# Patient Record
Sex: MALE | Race: BLACK OR AFRICAN AMERICAN | NOT HISPANIC OR LATINO | Employment: FULL TIME | URBAN - METROPOLITAN AREA
[De-identification: names, ages, dates, MRNs, and addresses within clinical notes are randomized per-mention and may not be internally consistent; named-entity substitution may affect disease eponyms.]

---

## 2017-05-09 DIAGNOSIS — E55.9 VITAMIN D DEFICIENCY: ICD-10-CM

## 2017-05-09 DIAGNOSIS — E03.9 HYPOTHYROIDISM, UNSPECIFIED TYPE: Primary | ICD-10-CM

## 2017-05-09 DIAGNOSIS — E78.2 MIXED HYPERLIPIDEMIA: ICD-10-CM

## 2017-05-09 DIAGNOSIS — E11.9 TYPE 2 DIABETES MELLITUS WITHOUT COMPLICATION, UNSPECIFIED LONG TERM INSULIN USE STATUS: ICD-10-CM

## 2017-05-09 PROBLEM — D64.9 ANEMIA: Status: ACTIVE | Noted: 2017-05-09

## 2017-05-23 RX ORDER — VARDENAFIL HCL 20 MG
TABLET ORAL
Qty: 4 TABLET | Refills: 0 | Status: SHIPPED | OUTPATIENT
Start: 2017-05-23 | End: 2017-06-08 | Stop reason: SDUPTHER

## 2017-05-24 ENCOUNTER — RESULTS ENCOUNTER (OUTPATIENT)
Dept: ENDOCRINOLOGY | Age: 67
End: 2017-05-24

## 2017-05-24 DIAGNOSIS — F52.21 ERECTILE DYSFUNCTION OF NONORGANIC ORIGIN: ICD-10-CM

## 2017-05-24 DIAGNOSIS — E78.2 MIXED HYPERLIPIDEMIA: ICD-10-CM

## 2017-05-24 DIAGNOSIS — E55.9 VITAMIN D DEFICIENCY: ICD-10-CM

## 2017-05-24 DIAGNOSIS — I10 ESSENTIAL HYPERTENSION: ICD-10-CM

## 2017-05-24 DIAGNOSIS — IMO0001 UNCONTROLLED TYPE 2 DIABETES MELLITUS WITHOUT COMPLICATION, WITHOUT LONG-TERM CURRENT USE OF INSULIN: ICD-10-CM

## 2017-05-24 DIAGNOSIS — E03.9 HYPOTHYROIDISM, UNSPECIFIED TYPE: ICD-10-CM

## 2017-06-01 ENCOUNTER — LAB (OUTPATIENT)
Dept: ENDOCRINOLOGY | Age: 67
End: 2017-06-01

## 2017-06-01 DIAGNOSIS — E78.2 MIXED HYPERLIPIDEMIA: ICD-10-CM

## 2017-06-01 DIAGNOSIS — E03.9 HYPOTHYROIDISM, UNSPECIFIED TYPE: ICD-10-CM

## 2017-06-01 DIAGNOSIS — E55.9 VITAMIN D DEFICIENCY: ICD-10-CM

## 2017-06-01 DIAGNOSIS — E11.9 TYPE 2 DIABETES MELLITUS WITHOUT COMPLICATION, UNSPECIFIED LONG TERM INSULIN USE STATUS: ICD-10-CM

## 2017-06-07 LAB
25(OH)D3+25(OH)D2 SERPL-MCNC: 31.9 NG/ML (ref 30–100)
ALBUMIN SERPL-MCNC: 3.9 G/DL (ref 3.5–5.2)
ALBUMIN/GLOB SERPL: 1.8 G/DL
ALP SERPL-CCNC: 55 U/L (ref 39–117)
ALT SERPL-CCNC: 15 U/L (ref 1–41)
AST SERPL-CCNC: 13 U/L (ref 1–40)
BILIRUB SERPL-MCNC: 0.5 MG/DL (ref 0.1–1.2)
BUN SERPL-MCNC: 12 MG/DL (ref 8–23)
BUN/CREAT SERPL: 10.6 (ref 7–25)
C PEPTIDE SERPL-MCNC: 2.4 NG/ML (ref 1.1–4.4)
CALCIUM SERPL-MCNC: 9.2 MG/DL (ref 8.6–10.5)
CHLORIDE SERPL-SCNC: 105 MMOL/L (ref 98–107)
CHOLEST SERPL-MCNC: 146 MG/DL (ref 0–200)
CO2 SERPL-SCNC: 28 MMOL/L (ref 22–29)
CREAT SERPL-MCNC: 1.13 MG/DL (ref 0.76–1.27)
FT4I SERPL CALC-MCNC: 2 (ref 1.2–4.9)
GLOBULIN SER CALC-MCNC: 2.2 GM/DL
GLUCOSE SERPL-MCNC: 75 MG/DL (ref 65–99)
HBA1C MFR BLD: 6.8 % (ref 4.8–5.6)
HDLC SERPL-MCNC: 53 MG/DL (ref 40–60)
LDLC SERPL CALC-MCNC: 85 MG/DL (ref 0–100)
MICROALBUMIN UR-MCNC: 5.1 UG/ML
POTASSIUM SERPL-SCNC: 4.2 MMOL/L (ref 3.5–5.2)
PROT SERPL-MCNC: 6.1 G/DL (ref 6–8.5)
SODIUM SERPL-SCNC: 144 MMOL/L (ref 136–145)
T3FREE SERPL-MCNC: 2.7 PG/ML (ref 2–4.4)
T3RU NFR SERPL: 29 % (ref 24–39)
T4 FREE SERPL-MCNC: 1.14 NG/DL (ref 0.93–1.7)
T4 SERPL-MCNC: 6.8 UG/DL (ref 4.5–12)
THYROGLOB AB SERPL-ACNC: 20 IU/ML
THYROGLOB SERPL-MCNC: 5.7 NG/ML
THYROGLOB SERPL-MCNC: ABNORMAL NG/ML
TRIGL SERPL-MCNC: 38 MG/DL (ref 0–150)
TSH SERPL DL<=0.005 MIU/L-ACNC: 2.01 UIU/ML (ref 0.45–4.5)
VLDLC SERPL CALC-MCNC: 7.6 MG/DL (ref 5–40)

## 2017-06-08 ENCOUNTER — OFFICE VISIT (OUTPATIENT)
Dept: ENDOCRINOLOGY | Age: 67
End: 2017-06-08

## 2017-06-08 VITALS
BODY MASS INDEX: 31.25 KG/M2 | DIASTOLIC BLOOD PRESSURE: 74 MMHG | WEIGHT: 235.8 LBS | SYSTOLIC BLOOD PRESSURE: 122 MMHG | HEIGHT: 73 IN

## 2017-06-08 DIAGNOSIS — E03.9 HYPOTHYROIDISM, UNSPECIFIED TYPE: ICD-10-CM

## 2017-06-08 DIAGNOSIS — E78.5 DYSLIPIDEMIA: ICD-10-CM

## 2017-06-08 DIAGNOSIS — I10 ESSENTIAL HYPERTENSION: ICD-10-CM

## 2017-06-08 DIAGNOSIS — E78.2 MIXED HYPERLIPIDEMIA: ICD-10-CM

## 2017-06-08 DIAGNOSIS — E11.8 CONTROLLED TYPE 2 DIABETES MELLITUS WITH COMPLICATION, WITHOUT LONG-TERM CURRENT USE OF INSULIN (HCC): Primary | ICD-10-CM

## 2017-06-08 DIAGNOSIS — E55.9 VITAMIN D DEFICIENCY: ICD-10-CM

## 2017-06-08 PROCEDURE — 99214 OFFICE O/P EST MOD 30 MIN: CPT | Performed by: NURSE PRACTITIONER

## 2017-06-08 RX ORDER — AMLODIPINE BESYLATE 10 MG/1
10 TABLET ORAL DAILY
COMMUNITY
Start: 2017-03-24 | End: 2017-12-14 | Stop reason: SDUPTHER

## 2017-06-08 RX ORDER — VALSARTAN 320 MG/1
320 TABLET ORAL DAILY
COMMUNITY
Start: 2017-03-24 | End: 2017-12-14 | Stop reason: SDUPTHER

## 2017-06-08 NOTE — PROGRESS NOTES
"hCarles Segura Sr. is a 66 y.o. male is here today for follow-up.  Chief Complaint   Patient presents with   • Diabetes     recent labs, pt did not bring meter, test BG infrequently.    • Hypothyroidism   • Vitamin D Deficiency   • Hyperlipidemia     /74  Ht 73\" (185.4 cm)  Wt 235 lb 12.8 oz (107 kg)  BMI 31.11 kg/m2  Current Outpatient Prescriptions on File Prior to Visit   Medication Sig   • aspirin 325 MG tablet Take 325 mg by mouth As Needed.   • atorvastatin (LIPITOR) 40 MG tablet Take 1 tablet by mouth Daily.   • CALCIUM PO Take by mouth.   • carvedilol CR (COREG CR) 80 MG 24 hr capsule Take 1 capsule by mouth Daily.   • Cholecalciferol (VITAMIN D) 1000 UNITS tablet Take 1,000 Units by mouth Daily.   • Cyanocobalamin (VITAMIN B-12 PO) Take by mouth.   • doxycycline (VIBRAMYCIN) 50 MG capsule Take 1 capsule by mouth daily.   • famotidine (PEPCID) 20 MG tablet Take 1 tablet by mouth 2 (two) times a day.   • Ferrous Sulfate (IRON) 325 (65 FE) MG tablet Take by mouth.   • glipiZIDE (GLIPIZIDE XL) 10 MG 24 hr tablet Take one tablet BID   • glucose blood test strip OneTouch Ultra Blue In Vitro Strip; Patient Sig: OneTouch Ultra Blue In Vitro Strip TEST 3 TIMES A DAY AND AS NEEDED; 100; 2; 29-May-2013; Active   • Hydrocortisone Butyr Lipo Base 0.1 % cream Apply topically.   • LEVITRA 20 MG tablet TAKE 1 TABLET BY MOUTH ONCE A WEEK   • levothyroxine (SYNTHROID, LEVOTHROID) 75 MCG tablet Take 1 tablet by mouth Daily.   • ONE TOUCH LANCETS misc    • pioglitazone-metFORMIN (ACTOPLUS MET)  MG per tablet Take 1 tablet every morning and two tablets at night   • SITagliptin (JANUVIA) 100 MG tablet Take 1 tablet by mouth Daily.   • [DISCONTINUED] glimepiride (AMARYL) 4 MG tablet Take 1 tablet by mouth 2 (two) times a day.   • [DISCONTINUED] amlodipine-olmesartan (JASON) 10-40 MG per tablet Take 1 tablet by mouth daily.   • [DISCONTINUED] LEVITRA 20 MG tablet TAKE 1 TABLET BY MOUTH ONCE A WEEK "     No current facility-administered medications on file prior to visit.      Family History   Problem Relation Age of Onset   • Heart failure Mother    • Diabetes Mother    • Heart disease Mother    • Hypertension Mother    • Thyroid disease Mother    • Diabetes Maternal Grandmother    • Hypertension Maternal Grandmother    • Diabetes Paternal Grandmother      Social History   Substance Use Topics   • Smoking status: Never Smoker   • Smokeless tobacco: None   • Alcohol use No      Comment: stopped     No Known Allergies      History of Present Illness  Encounter Diagnoses   Name Primary?   • Uncontrolled type 2 diabetes mellitus with other specified complication, without long-term current use of insulin Yes   • Vitamin D deficiency    • Hypothyroidism, unspecified type    • Mixed hyperlipidemia    • Essential hypertension    • Dyslipidemia    This is a 66-year-old male patient here today for routine follow-up visit for the above-mentioned problems.  He is taking his medications as prescribed.  He denies any hypoglycemic reactions.  He has had no reason which to go the emergency room.  He is working to lose weight and would like to lose about 10 more pounds.  He states he has stopped about 5 pounds recently.  He is watching his diet and exercising in order to lose weight.  He is taking daily over-the-counter vitamin D.  He had recent labs which were reviewed and he was provided a copy.  He states he has good energy and has no complaints at today's visit.        The following portions of the patient's history were reviewed and updated as appropriate: allergies, current medications, past family history, past medical history, past social history, past surgical history and problem list.    Review of Systems   Constitutional: Negative for fatigue.   HENT: Negative for trouble swallowing.    Eyes: Negative for visual disturbance.   Respiratory: Negative for shortness of breath.    Cardiovascular: Negative for leg  swelling.   Endocrine: Negative for polyphagia.   Skin: Negative for wound.   Neurological: Negative for numbness.       Objective   Physical Exam   Constitutional: He is oriented to person, place, and time. He appears well-developed and well-nourished. No distress.   HENT:   Head: Normocephalic and atraumatic.   Right Ear: External ear normal.   Left Ear: External ear normal.   Nose: Nose normal.   Mouth/Throat: Oropharynx is clear and moist.   Eyes: Conjunctivae and EOM are normal. Pupils are equal, round, and reactive to light. Right eye exhibits no discharge. Left eye exhibits no discharge. No scleral icterus.   Neck: Normal range of motion. Neck supple. No JVD present. No tracheal deviation present. No thyromegaly present.   Cardiovascular: Normal rate, regular rhythm, normal heart sounds and intact distal pulses.  Exam reveals no gallop and no friction rub.    No murmur heard.  Pulmonary/Chest: Effort normal and breath sounds normal. No stridor. No respiratory distress. He has no wheezes. He has no rales. He exhibits no tenderness.   Abdominal: Soft. Bowel sounds are normal. He exhibits no distension and no mass. There is no tenderness. There is no rebound and no guarding. No hernia.   Musculoskeletal: Normal range of motion. He exhibits no edema, tenderness or deformity.   Lymphadenopathy:     He has no cervical adenopathy.   Neurological: He is alert and oriented to person, place, and time. He has normal reflexes. He displays normal reflexes. No cranial nerve deficit. He exhibits normal muscle tone. Coordination normal.   Skin: Skin is warm. No rash noted. He is not diaphoretic. No erythema. No pallor.   Psychiatric: He has a normal mood and affect. His behavior is normal. Judgment and thought content normal.   Nursing note and vitals reviewed.    Results for orders placed or performed in visit on 06/01/17   Comprehensive Metabolic Panel   Result Value Ref Range    Glucose 75 65 - 99 mg/dL    BUN 12 8 - 23  mg/dL    Creatinine 1.13 0.76 - 1.27 mg/dL    eGFR Non African Am 65 >60 mL/min/1.73    eGFR African Am 79 >60 mL/min/1.73    BUN/Creatinine Ratio 10.6 7.0 - 25.0    Sodium 144 136 - 145 mmol/L    Potassium 4.2 3.5 - 5.2 mmol/L    Chloride 105 98 - 107 mmol/L    Total CO2 28.0 22.0 - 29.0 mmol/L    Calcium 9.2 8.6 - 10.5 mg/dL    Total Protein 6.1 6.0 - 8.5 g/dL    Albumin 3.90 3.50 - 5.20 g/dL    Globulin 2.2 gm/dL    A/G Ratio 1.8 g/dL    Total Bilirubin 0.5 0.1 - 1.2 mg/dL    Alkaline Phosphatase 55 39 - 117 U/L    AST (SGOT) 13 1 - 40 U/L    ALT (SGPT) 15 1 - 41 U/L   Lipid Panel   Result Value Ref Range    Total Cholesterol 146 0 - 200 mg/dL    Triglycerides 38 0 - 150 mg/dL    HDL Cholesterol 53 40 - 60 mg/dL    VLDL Cholesterol 7.6 5 - 40 mg/dL    LDL Cholesterol  85 0 - 100 mg/dL   Vitamin D 25 Hydroxy   Result Value Ref Range    25 Hydroxy, Vitamin D 31.9 30.0 - 100.0 ng/mL   Hemoglobin A1c   Result Value Ref Range    Hemoglobin A1C 6.80 (H) 4.80 - 5.60 %   C-Peptide   Result Value Ref Range    C-Peptide 2.4 1.1 - 4.4 ng/mL   T3, Free   Result Value Ref Range    T3, Free 2.7 2.0 - 4.4 pg/mL   T4, Free   Result Value Ref Range    Free T4 1.14 0.93 - 1.70 ng/dL   Thyroid Panel With TSH   Result Value Ref Range    TSH 2.010 0.450 - 4.500 uIU/mL    T4, Total 6.8 4.5 - 12.0 ug/dL    T3 Uptake 29 24 - 39 %    Free Thyroxine Index 2.0 1.2 - 4.9   Comprehensive Thyroglobulin   Result Value Ref Range    Thyroglobulin Ab 20 (H) IU/mL    Thyroglobulin Comment ng/mL    Thyroglobulin (TG-[place]) 5.7 ng/mL   MicroAlbumin, Urine, Random   Result Value Ref Range    Microalbumin, Urine 5.1 Not Estab. ug/mL         Assessment/Plan   Encounter Diagnoses   Name Primary?   • Vitamin D deficiency    • Hypothyroidism, unspecified type    • Mixed hyperlipidemia    • Essential hypertension    • Dyslipidemia    • Controlled type 2 diabetes mellitus with complication, without long-term current use of insulin Yes       In summary,  patient was seen and examined.  His blood pressure is stable as is his weight.  He is taking his medications as prescribed.  His cholesterol is well-controlled.  His thyroid hormones are an satisfactory range.  His vitamin D is on the low side of normal he's been instructed to continue taking vitamin D daily.  He has no complaints at today's visit.  Metabolically he is stable.  He will follow-up with Dr. Mccabe in 6 months as scheduled with labs prior.  I've encouraged him to contact the office with any questions or concerns prior to his next visit.  He is currently not checking blood sugars.  He has been advised to check his blood sugars should he have any signs and symptoms of hyper or hypoglycemia.  He is also been cautioned not to skip meals due to potential hypoglycemia from taking a sulfonylurea.  No medications were changed at today's visit.  His diabetes is under good control and his hemoglobin A1c is at goal.

## 2017-07-12 RX ORDER — PIOGLITAZONE HCL AND METFORMIN HCL 850; 15 MG/1; MG/1
TABLET ORAL
Qty: 270 TABLET | Refills: 0 | Status: SHIPPED | OUTPATIENT
Start: 2017-07-12 | End: 2017-10-01 | Stop reason: SDUPTHER

## 2017-07-12 RX ORDER — GLIPIZIDE 10 MG/1
TABLET, FILM COATED, EXTENDED RELEASE ORAL
Qty: 180 TABLET | Refills: 0 | Status: SHIPPED | OUTPATIENT
Start: 2017-07-12 | End: 2017-10-01 | Stop reason: SDUPTHER

## 2017-07-12 RX ORDER — SITAGLIPTIN 100 MG/1
TABLET, FILM COATED ORAL
Qty: 90 TABLET | Refills: 0 | Status: SHIPPED | OUTPATIENT
Start: 2017-07-12 | End: 2017-10-01 | Stop reason: SDUPTHER

## 2017-07-12 RX ORDER — ATORVASTATIN CALCIUM 40 MG/1
TABLET, FILM COATED ORAL
Qty: 90 TABLET | Refills: 0 | Status: SHIPPED | OUTPATIENT
Start: 2017-07-12 | End: 2017-10-01 | Stop reason: SDUPTHER

## 2017-07-12 RX ORDER — CARVEDILOL PHOSPHATE 80 MG
CAPSULE,EXTENDED RELEASE MULTIPHASE 24HR ORAL
Qty: 90 CAPSULE | Refills: 0 | Status: SHIPPED | OUTPATIENT
Start: 2017-07-12 | End: 2017-10-01 | Stop reason: SDUPTHER

## 2017-07-12 RX ORDER — LEVOTHYROXINE SODIUM 0.07 MG/1
TABLET ORAL
Qty: 90 TABLET | Refills: 0 | Status: SHIPPED | OUTPATIENT
Start: 2017-07-12 | End: 2017-10-01 | Stop reason: SDUPTHER

## 2017-10-02 RX ORDER — SITAGLIPTIN 100 MG/1
TABLET, FILM COATED ORAL
Qty: 90 TABLET | Refills: 0 | Status: SHIPPED | OUTPATIENT
Start: 2017-10-02 | End: 2017-12-14 | Stop reason: SDUPTHER

## 2017-10-02 RX ORDER — PIOGLITAZONE HCL AND METFORMIN HCL 850; 15 MG/1; MG/1
TABLET ORAL
Qty: 270 TABLET | Refills: 0 | Status: SHIPPED | OUTPATIENT
Start: 2017-10-02 | End: 2017-12-14 | Stop reason: SDUPTHER

## 2017-10-02 RX ORDER — LEVOTHYROXINE SODIUM 0.07 MG/1
TABLET ORAL
Qty: 90 TABLET | Refills: 0 | Status: SHIPPED | OUTPATIENT
Start: 2017-10-02 | End: 2017-12-14 | Stop reason: SDUPTHER

## 2017-10-02 RX ORDER — CARVEDILOL PHOSPHATE 80 MG
CAPSULE,EXTENDED RELEASE MULTIPHASE 24HR ORAL
Qty: 90 CAPSULE | Refills: 0 | Status: SHIPPED | OUTPATIENT
Start: 2017-10-02 | End: 2017-12-14 | Stop reason: SDUPTHER

## 2017-10-02 RX ORDER — GLIPIZIDE 10 MG/1
TABLET, FILM COATED, EXTENDED RELEASE ORAL
Qty: 180 TABLET | Refills: 0 | Status: SHIPPED | OUTPATIENT
Start: 2017-10-02 | End: 2017-12-14 | Stop reason: SDUPTHER

## 2017-10-02 RX ORDER — ATORVASTATIN CALCIUM 40 MG/1
TABLET, FILM COATED ORAL
Qty: 90 TABLET | Refills: 0 | Status: SHIPPED | OUTPATIENT
Start: 2017-10-02 | End: 2017-12-14 | Stop reason: SDUPTHER

## 2017-12-08 LAB
25(OH)D3+25(OH)D2 SERPL-MCNC: 40.9 NG/ML (ref 30–100)
ALBUMIN SERPL-MCNC: 3.8 G/DL (ref 3.5–5.2)
ALBUMIN/GLOB SERPL: 1.5 G/DL
ALP SERPL-CCNC: 68 U/L (ref 39–117)
ALT SERPL-CCNC: 14 U/L (ref 1–41)
AST SERPL-CCNC: 15 U/L (ref 1–40)
BILIRUB SERPL-MCNC: 0.5 MG/DL (ref 0.1–1.2)
BUN SERPL-MCNC: 14 MG/DL (ref 8–23)
BUN/CREAT SERPL: 12.3 (ref 7–25)
C PEPTIDE SERPL-MCNC: 3 NG/ML (ref 1.1–4.4)
CALCIUM SERPL-MCNC: 9 MG/DL (ref 8.6–10.5)
CHLORIDE SERPL-SCNC: 104 MMOL/L (ref 98–107)
CHOLEST SERPL-MCNC: 151 MG/DL (ref 0–200)
CO2 SERPL-SCNC: 27.8 MMOL/L (ref 22–29)
CREAT SERPL-MCNC: 1.14 MG/DL (ref 0.76–1.27)
GFR SERPLBLD CREATININE-BSD FMLA CKD-EPI: 64 ML/MIN/1.73
GFR SERPLBLD CREATININE-BSD FMLA CKD-EPI: 78 ML/MIN/1.73
GLOBULIN SER CALC-MCNC: 2.6 GM/DL
GLUCOSE SERPL-MCNC: 95 MG/DL (ref 65–99)
HBA1C MFR BLD: 6.8 % (ref 4.8–5.6)
HDLC SERPL-MCNC: 47 MG/DL (ref 40–60)
INTERPRETATION: NORMAL
LDLC SERPL CALC-MCNC: 95 MG/DL (ref 0–100)
Lab: NORMAL
POTASSIUM SERPL-SCNC: 4.2 MMOL/L (ref 3.5–5.2)
PROT SERPL-MCNC: 6.4 G/DL (ref 6–8.5)
SODIUM SERPL-SCNC: 144 MMOL/L (ref 136–145)
T3FREE SERPL-MCNC: 2.5 PG/ML (ref 2–4.4)
T4 FREE SERPL-MCNC: 1.25 NG/DL (ref 0.93–1.7)
T4 SERPL-MCNC: 7.12 MCG/DL (ref 4.5–11.7)
TRIGL SERPL-MCNC: 46 MG/DL (ref 0–150)
TSH SERPL DL<=0.005 MIU/L-ACNC: 2.71 MIU/ML (ref 0.27–4.2)
UNABLE TO VOID: NORMAL
URATE SERPL-MCNC: 4.5 MG/DL (ref 3.4–7)
VLDLC SERPL CALC-MCNC: 9.2 MG/DL (ref 5–40)

## 2017-12-14 ENCOUNTER — OFFICE VISIT (OUTPATIENT)
Dept: ENDOCRINOLOGY | Age: 67
End: 2017-12-14

## 2017-12-14 VITALS
BODY MASS INDEX: 31.14 KG/M2 | WEIGHT: 235 LBS | SYSTOLIC BLOOD PRESSURE: 114 MMHG | RESPIRATION RATE: 16 BRPM | HEIGHT: 73 IN | DIASTOLIC BLOOD PRESSURE: 62 MMHG

## 2017-12-14 DIAGNOSIS — I10 ESSENTIAL HYPERTENSION: ICD-10-CM

## 2017-12-14 DIAGNOSIS — E11.8 CONTROLLED TYPE 2 DIABETES MELLITUS WITH COMPLICATION, WITHOUT LONG-TERM CURRENT USE OF INSULIN (HCC): Primary | ICD-10-CM

## 2017-12-14 DIAGNOSIS — E06.3 HYPOTHYROIDISM DUE TO HASHIMOTO'S THYROIDITIS: ICD-10-CM

## 2017-12-14 DIAGNOSIS — E78.2 MIXED HYPERLIPIDEMIA: ICD-10-CM

## 2017-12-14 DIAGNOSIS — E55.9 VITAMIN D DEFICIENCY: ICD-10-CM

## 2017-12-14 DIAGNOSIS — E03.8 HYPOTHYROIDISM DUE TO HASHIMOTO'S THYROIDITIS: ICD-10-CM

## 2017-12-14 PROCEDURE — 99214 OFFICE O/P EST MOD 30 MIN: CPT | Performed by: INTERNAL MEDICINE

## 2017-12-14 RX ORDER — GLIPIZIDE 10 MG/1
10 TABLET, FILM COATED, EXTENDED RELEASE ORAL 2 TIMES DAILY
Qty: 180 TABLET | Refills: 3 | Status: SHIPPED | OUTPATIENT
Start: 2017-12-14 | End: 2018-12-14 | Stop reason: SDUPTHER

## 2017-12-14 RX ORDER — ATORVASTATIN CALCIUM 40 MG/1
40 TABLET, FILM COATED ORAL NIGHTLY
Qty: 90 TABLET | Refills: 3 | Status: SHIPPED | OUTPATIENT
Start: 2017-12-14 | End: 2018-12-14 | Stop reason: SDUPTHER

## 2017-12-14 RX ORDER — VARDENAFIL HYDROCHLORIDE 20 MG/1
20 TABLET ORAL DAILY PRN
Qty: 24 TABLET | Refills: 3 | Status: SHIPPED | OUTPATIENT
Start: 2017-12-14 | End: 2019-02-01 | Stop reason: SDUPTHER

## 2017-12-14 RX ORDER — AMLODIPINE BESYLATE 10 MG/1
10 TABLET ORAL DAILY
Qty: 90 TABLET | Refills: 3 | Status: SHIPPED | OUTPATIENT
Start: 2017-12-14 | End: 2018-12-14 | Stop reason: SDUPTHER

## 2017-12-14 RX ORDER — CARVEDILOL PHOSPHATE 80 MG/1
80 CAPSULE, EXTENDED RELEASE ORAL DAILY
Qty: 90 CAPSULE | Refills: 3 | Status: SHIPPED | OUTPATIENT
Start: 2017-12-14 | End: 2018-12-14 | Stop reason: SDUPTHER

## 2017-12-14 RX ORDER — LEVOTHYROXINE SODIUM 0.07 MG/1
TABLET ORAL
Qty: 90 TABLET | Refills: 3 | Status: SHIPPED | OUTPATIENT
Start: 2017-12-14 | End: 2018-12-14 | Stop reason: SDUPTHER

## 2017-12-14 RX ORDER — VALSARTAN 320 MG/1
320 TABLET ORAL DAILY
Qty: 90 TABLET | Refills: 3 | Status: SHIPPED | OUTPATIENT
Start: 2017-12-14 | End: 2018-09-24

## 2017-12-14 NOTE — PROGRESS NOTES
"Subjective   Wilver Segura Sr. is a 67 y.o. male seen for follow up for DM2, hypothyroidism, HTN, dyslipidemia, ED, vit d deficiency, lab review. He is only checking BG as needed. He denies any problems or concerns.   History of Present Illness this is a 67-year-old gentleman known patient with type II diabetes hypertension and dyslipidemia as well as hypothyroidism with e rectal dysfunction and vitamin D deficiency.  Over the course of last 6 months he has had no significant health problems for which to go to the emergency room or hospital.    /62  Resp 16  Ht 185.4 cm (73\")  Wt 107 kg (235 lb)  BMI 31 kg/m2     No Known Allergies    Current Outpatient Prescriptions:   •  amLODIPine (NORVASC) 10 MG tablet, Take 10 mg by mouth Daily., Disp: , Rfl:   •  aspirin 325 MG tablet, Take 325 mg by mouth As Needed., Disp: , Rfl:   •  atorvastatin (LIPITOR) 40 MG tablet, TAKE 1 TABLET DAILY, Disp: 90 tablet, Rfl: 0  •  CALCIUM PO, Take by mouth., Disp: , Rfl:   •  Cholecalciferol (VITAMIN D) 1000 UNITS tablet, Take 1,000 Units by mouth Daily., Disp: , Rfl:   •  COREG CR 80 MG 24 hr capsule, TAKE 1 CAPSULE DAILY, Disp: 90 capsule, Rfl: 0  •  Cyanocobalamin (VITAMIN B-12 PO), Take by mouth., Disp: , Rfl:   •  doxycycline (VIBRAMYCIN) 50 MG capsule, Take 1 capsule by mouth daily., Disp: , Rfl:   •  famotidine (PEPCID) 20 MG tablet, Take 1 tablet by mouth 2 (two) times a day., Disp: , Rfl:   •  Ferrous Sulfate (IRON) 325 (65 FE) MG tablet, Take by mouth., Disp: , Rfl:   •  glipiZIDE (GLUCOTROL) 10 MG 24 hr tablet, TAKE 1 TABLET TWICE A DAY, Disp: 180 tablet, Rfl: 0  •  glucose blood test strip, OneTouch Ultra Blue In Vitro Strip; Patient Sig: OneTouch Ultra Blue In Vitro Strip TEST 3 TIMES A DAY AND AS NEEDED; 100; 2; 29-May-2013; Active, Disp: , Rfl:   •  Hydrocortisone Butyr Lipo Base 0.1 % cream, Apply topically., Disp: , Rfl:   •  JANUVIA 100 MG tablet, TAKE 1 TABLET DAILY, Disp: 90 tablet, Rfl: 0  •  LEVITRA 20 " MG tablet, TAKE 1 TABLET BY MOUTH ONCE A WEEK, Disp: 4 tablet, Rfl: 0  •  levothyroxine (SYNTHROID, LEVOTHROID) 75 MCG tablet, TAKE 1 TABLET DAILY, Disp: 90 tablet, Rfl: 0  •  ONE TOUCH LANCETS misc, , Disp: , Rfl:   •  pioglitazone-metFORMIN (ACTOPLUS MET)  MG per tablet, TAKE 1 TABLET EVERY MORNING AND 2 TABLETS AT NIGHT, Disp: 270 tablet, Rfl: 0  •  valsartan (DIOVAN) 320 MG tablet, Take 320 mg by mouth Daily., Disp: , Rfl:       The following portions of the patient's history were reviewed and updated as appropriate: allergies, current medications, past family history, past medical history, past social history, past surgical history and problem list.    Review of Systems   Constitutional: Negative.    HENT: Negative.    Eyes: Negative.    Respiratory: Negative.    Cardiovascular: Negative.    Gastrointestinal: Negative.    Endocrine: Negative.    Genitourinary: Negative.    Musculoskeletal: Negative.    Skin: Negative.    Allergic/Immunologic: Negative.    Neurological: Negative.    Hematological: Negative.    Psychiatric/Behavioral: Negative.        Objective   Physical Exam   Constitutional: He is oriented to person, place, and time. He appears well-developed and well-nourished. No distress.   HENT:   Head: Normocephalic and atraumatic.   Right Ear: External ear normal.   Left Ear: External ear normal.   Nose: Nose normal.   Mouth/Throat: Oropharynx is clear and moist.   Eyes: Conjunctivae and EOM are normal. Pupils are equal, round, and reactive to light. Right eye exhibits no discharge. Left eye exhibits no discharge. No scleral icterus.   Neck: Normal range of motion. Neck supple. No JVD present. No tracheal deviation present. No thyromegaly present.   Cardiovascular: Normal rate, regular rhythm, normal heart sounds and intact distal pulses.  Exam reveals no gallop and no friction rub.    No murmur heard.  Pulmonary/Chest: Effort normal and breath sounds normal. No stridor. No respiratory distress. He  has no wheezes. He has no rales. He exhibits no tenderness.   Abdominal: Soft. Bowel sounds are normal. He exhibits no distension and no mass. There is no tenderness. There is no rebound and no guarding. No hernia.   Musculoskeletal: Normal range of motion. He exhibits no edema, tenderness or deformity.   Lymphadenopathy:     He has no cervical adenopathy.   Neurological: He is alert and oriented to person, place, and time. He has normal reflexes. He displays normal reflexes. No cranial nerve deficit. He exhibits normal muscle tone. Coordination normal.   Skin: Skin is warm. No rash noted. He is not diaphoretic. No erythema. No pallor.   Psychiatric: He has a normal mood and affect. His behavior is normal. Judgment and thought content normal.   Nursing note and vitals reviewed.    Results for orders placed or performed in visit on 12/07/17   Comprehensive Metabolic Panel   Result Value Ref Range    Glucose 95 65 - 99 mg/dL    BUN 14 8 - 23 mg/dL    Creatinine 1.14 0.76 - 1.27 mg/dL    eGFR Non African Am 64 >60 mL/min/1.73    eGFR African Am 78 >60 mL/min/1.73    BUN/Creatinine Ratio 12.3 7.0 - 25.0    Sodium 144 136 - 145 mmol/L    Potassium 4.2 3.5 - 5.2 mmol/L    Chloride 104 98 - 107 mmol/L    Total CO2 27.8 22.0 - 29.0 mmol/L    Calcium 9.0 8.6 - 10.5 mg/dL    Total Protein 6.4 6.0 - 8.5 g/dL    Albumin 3.80 3.50 - 5.20 g/dL    Globulin 2.6 gm/dL    A/G Ratio 1.5 g/dL    Total Bilirubin 0.5 0.1 - 1.2 mg/dL    Alkaline Phosphatase 68 39 - 117 U/L    AST (SGOT) 15 1 - 40 U/L    ALT (SGPT) 14 1 - 41 U/L   Lipid Panel   Result Value Ref Range    Total Cholesterol 151 0 - 200 mg/dL    Triglycerides 46 0 - 150 mg/dL    HDL Cholesterol 47 40 - 60 mg/dL    VLDL Cholesterol 9.2 5 - 40 mg/dL    LDL Cholesterol  95 0 - 100 mg/dL   T4 & TSH ([facility])   Result Value Ref Range    TSH 2.710 0.270 - 4.200 mIU/mL    T4, Total 7.12 4.50 - 11.70 mcg/dL   Hemoglobin A1c   Result Value Ref Range    Hemoglobin A1C 6.80 (H) 4.80 -  5.60 %   T4, Free   Result Value Ref Range    Free T4 1.25 0.93 - 1.70 ng/dL   C-Peptide   Result Value Ref Range    C-Peptide 3.0 1.1 - 4.4 ng/mL   Vitamin D 25 Hydroxy   Result Value Ref Range    25 Hydroxy, Vitamin D 40.9 30.0 - 100.0 ng/mL   Uric Acid   Result Value Ref Range    Uric Acid 4.5 3.4 - 7.0 mg/dL   T3, Free   Result Value Ref Range    T3, Free 2.5 2.0 - 4.4 pg/mL   Unable To Void   Result Value Ref Range    Unable to Void Comment    Cardiovascular Risk Assessment   Result Value Ref Range    Interpretation Note    Diabetes Patient Education   Result Value Ref Range    PDF Image Not applicable          Assessment/Plan   Diagnoses and all orders for this visit:    Controlled type 2 diabetes mellitus with complication, without long-term current use of insulin  -     T4 & TSH ([facility]); Future  -     Uric Acid; Future  -     Vitamin D 25 Hydroxy; Future  -     Comprehensive Metabolic Panel; Future  -     C-Peptide; Future  -     Hemoglobin A1c; Future  -     Lipid Panel; Future  -     MicroAlbumin, Urine, Random - Urine, Clean Catch; Future    Mixed hyperlipidemia  -     T4 & TSH ([facility]); Future  -     Uric Acid; Future  -     Vitamin D 25 Hydroxy; Future  -     Comprehensive Metabolic Panel; Future  -     C-Peptide; Future  -     Hemoglobin A1c; Future  -     Lipid Panel; Future  -     MicroAlbumin, Urine, Random - Urine, Clean Catch; Future    Essential hypertension  -     T4 & TSH ([facility]); Future  -     Uric Acid; Future  -     Vitamin D 25 Hydroxy; Future  -     Comprehensive Metabolic Panel; Future  -     C-Peptide; Future  -     Hemoglobin A1c; Future  -     Lipid Panel; Future  -     MicroAlbumin, Urine, Random - Urine, Clean Catch; Future    Vitamin D deficiency  -     T4 & TSH ([facility]); Future  -     Uric Acid; Future  -     Vitamin D 25 Hydroxy; Future  -     Comprehensive Metabolic Panel; Future  -     C-Peptide; Future  -     Hemoglobin A1c; Future  -     Lipid Panel; Future  -      MicroAlbumin, Urine, Random - Urine, Clean Catch; Future    Hypothyroidism due to Hashimoto's thyroiditis  -     T4 & TSH ([facility]); Future  -     Uric Acid; Future  -     Vitamin D 25 Hydroxy; Future  -     Comprehensive Metabolic Panel; Future  -     C-Peptide; Future  -     Hemoglobin A1c; Future  -     Lipid Panel; Future  -     MicroAlbumin, Urine, Random - Urine, Clean Catch; Future    Other orders  -     valsartan (DIOVAN) 320 MG tablet; Take 1 tablet by mouth Daily.  -     levothyroxine (SYNTHROID, LEVOTHROID) 75 MCG tablet; Take 1 tablet by mouth daily  -     vardenafil (LEVITRA) 20 MG tablet; Take 1 tablet by mouth Daily As Needed for erectile dysfunction.  -     SITagliptin (JANUVIA) 100 MG tablet; Take 1 tablet by mouth Daily.  -     glipiZIDE (GLUCOTROL XL) 10 MG 24 hr tablet; Take 1 tablet by mouth 2 (Two) Times a Day.  -     carvedilol CR (COREG CR) 80 MG 24 hr capsule; Take 1 capsule by mouth Daily.  -     atorvastatin (LIPITOR) 40 MG tablet; Take 1 tablet by mouth Every Night.  -     amLODIPine (NORVASC) 10 MG tablet; Take 1 tablet by mouth Daily.               In summary I saw and examined this 67-year-old gentleman for above-mentioned problems.  I reviewed his laboratory evaluation of 12/07/2017 and provided him with a hard copy of it.  Overall he is clinically and metabolically stable and therefore we will go ahead and continue all his current prescriptions.  He will see Ms. Brie Fraser in 6 months or sooner if needed with laboratory evaluation prior to each office visit.

## 2017-12-15 RX ORDER — PIOGLITAZONE HCL AND METFORMIN HCL 850; 15 MG/1; MG/1
TABLET ORAL
Qty: 270 TABLET | Refills: 0 | Status: SHIPPED | OUTPATIENT
Start: 2017-12-15 | End: 2018-03-31 | Stop reason: SDUPTHER

## 2018-04-02 RX ORDER — PIOGLITAZONE HCL AND METFORMIN HCL 850; 15 MG/1; MG/1
TABLET ORAL
Qty: 270 TABLET | Refills: 0 | Status: SHIPPED | OUTPATIENT
Start: 2018-04-02 | End: 2018-06-16 | Stop reason: SDUPTHER

## 2018-05-25 DIAGNOSIS — E55.9 VITAMIN D DEFICIENCY: ICD-10-CM

## 2018-05-25 DIAGNOSIS — E11.8 CONTROLLED TYPE 2 DIABETES MELLITUS WITH COMPLICATION, WITHOUT LONG-TERM CURRENT USE OF INSULIN (HCC): ICD-10-CM

## 2018-05-25 DIAGNOSIS — E03.8 HYPOTHYROIDISM DUE TO HASHIMOTO'S THYROIDITIS: Primary | ICD-10-CM

## 2018-05-25 DIAGNOSIS — N40.0 BPH WITHOUT OBSTRUCTION/LOWER URINARY TRACT SYMPTOMS: ICD-10-CM

## 2018-05-25 DIAGNOSIS — F52.21 ERECTILE DYSFUNCTION OF NONORGANIC ORIGIN: ICD-10-CM

## 2018-05-25 DIAGNOSIS — E78.5 DYSLIPIDEMIA: ICD-10-CM

## 2018-05-25 DIAGNOSIS — E06.3 HYPOTHYROIDISM DUE TO HASHIMOTO'S THYROIDITIS: Primary | ICD-10-CM

## 2018-05-31 ENCOUNTER — LAB (OUTPATIENT)
Dept: ENDOCRINOLOGY | Age: 68
End: 2018-05-31

## 2018-05-31 DIAGNOSIS — E03.8 HYPOTHYROIDISM DUE TO HASHIMOTO'S THYROIDITIS: ICD-10-CM

## 2018-05-31 DIAGNOSIS — E11.8 CONTROLLED TYPE 2 DIABETES MELLITUS WITH COMPLICATION, WITHOUT LONG-TERM CURRENT USE OF INSULIN (HCC): ICD-10-CM

## 2018-05-31 DIAGNOSIS — E78.5 DYSLIPIDEMIA: ICD-10-CM

## 2018-05-31 DIAGNOSIS — E06.3 HYPOTHYROIDISM DUE TO HASHIMOTO'S THYROIDITIS: ICD-10-CM

## 2018-05-31 DIAGNOSIS — E55.9 VITAMIN D DEFICIENCY: ICD-10-CM

## 2018-05-31 DIAGNOSIS — F52.21 ERECTILE DYSFUNCTION OF NONORGANIC ORIGIN: ICD-10-CM

## 2018-05-31 DIAGNOSIS — N40.0 BPH WITHOUT OBSTRUCTION/LOWER URINARY TRACT SYMPTOMS: ICD-10-CM

## 2018-06-05 LAB
25(OH)D3+25(OH)D2 SERPL-MCNC: 37.3 NG/ML (ref 30–100)
ALBUMIN SERPL-MCNC: 3.9 G/DL (ref 3.5–5.2)
ALBUMIN/GLOB SERPL: 1.8 G/DL
ALP SERPL-CCNC: 60 U/L (ref 39–117)
ALT SERPL-CCNC: 14 U/L (ref 1–41)
AST SERPL-CCNC: 12 U/L (ref 1–40)
BILIRUB SERPL-MCNC: 0.5 MG/DL (ref 0.1–1.2)
BUN SERPL-MCNC: 13 MG/DL (ref 8–23)
BUN/CREAT SERPL: 10.7 (ref 7–25)
C PEPTIDE SERPL-MCNC: 2.7 NG/ML (ref 1.1–4.4)
CALCIUM SERPL-MCNC: 9.2 MG/DL (ref 8.6–10.5)
CHLORIDE SERPL-SCNC: 101 MMOL/L (ref 98–107)
CHOLEST SERPL-MCNC: 144 MG/DL (ref 0–200)
CO2 SERPL-SCNC: 25.8 MMOL/L (ref 22–29)
CREAT SERPL-MCNC: 1.21 MG/DL (ref 0.76–1.27)
FT4I SERPL CALC-MCNC: 1.8 (ref 1.2–4.9)
GFR SERPLBLD CREATININE-BSD FMLA CKD-EPI: 60 ML/MIN/1.73
GFR SERPLBLD CREATININE-BSD FMLA CKD-EPI: 72 ML/MIN/1.73
GLOBULIN SER CALC-MCNC: 2.2 GM/DL
GLUCOSE SERPL-MCNC: 113 MG/DL (ref 65–99)
HBA1C MFR BLD: 6.74 % (ref 4.8–5.6)
HCT VFR BLD AUTO: 41.9 % (ref 40.4–52.2)
HDLC SERPL-MCNC: 51 MG/DL (ref 40–60)
HGB BLD-MCNC: 13.5 G/DL (ref 13.7–17.6)
INTERPRETATION: NORMAL
LDLC SERPL CALC-MCNC: 85 MG/DL (ref 0–100)
Lab: NORMAL
MICROALBUMIN UR-MCNC: <3 UG/ML
POTASSIUM SERPL-SCNC: 4.1 MMOL/L (ref 3.5–5.2)
PROT SERPL-MCNC: 6.1 G/DL (ref 6–8.5)
PSA SERPL-MCNC: 5.34 NG/ML (ref 0–4)
SHBG SERPL-SCNC: 42 NMOL/L (ref 19.3–76.4)
SODIUM SERPL-SCNC: 141 MMOL/L (ref 136–145)
T3FREE SERPL-MCNC: 2.6 PG/ML (ref 2–4.4)
T3RU NFR SERPL: 26 % (ref 24–39)
T4 FREE SERPL-MCNC: 1.18 NG/DL (ref 0.93–1.7)
T4 SERPL-MCNC: 7.1 UG/DL (ref 4.5–12)
TESTOST FREE SERPL-MCNC: 7.9 PG/ML (ref 6.6–18.1)
TESTOST SERPL-MCNC: 504 NG/DL (ref 264–916)
THYROGLOB AB SERPL-ACNC: 14 IU/ML
THYROGLOB SERPL-MCNC: 3.1 NG/ML
THYROGLOB SERPL-MCNC: ABNORMAL NG/ML
TRIGL SERPL-MCNC: 42 MG/DL (ref 0–150)
TSH SERPL DL<=0.005 MIU/L-ACNC: 2.59 UIU/ML (ref 0.45–4.5)
VLDLC SERPL CALC-MCNC: 8.4 MG/DL (ref 5–40)

## 2018-06-06 ENCOUNTER — RESULTS ENCOUNTER (OUTPATIENT)
Dept: ENDOCRINOLOGY | Age: 68
End: 2018-06-06

## 2018-06-06 DIAGNOSIS — E03.8 HYPOTHYROIDISM DUE TO HASHIMOTO'S THYROIDITIS: ICD-10-CM

## 2018-06-06 DIAGNOSIS — E55.9 VITAMIN D DEFICIENCY: ICD-10-CM

## 2018-06-06 DIAGNOSIS — I10 ESSENTIAL HYPERTENSION: ICD-10-CM

## 2018-06-06 DIAGNOSIS — E06.3 HYPOTHYROIDISM DUE TO HASHIMOTO'S THYROIDITIS: ICD-10-CM

## 2018-06-06 DIAGNOSIS — E78.2 MIXED HYPERLIPIDEMIA: ICD-10-CM

## 2018-06-06 DIAGNOSIS — E11.8 CONTROLLED TYPE 2 DIABETES MELLITUS WITH COMPLICATION, WITHOUT LONG-TERM CURRENT USE OF INSULIN (HCC): ICD-10-CM

## 2018-06-14 ENCOUNTER — OFFICE VISIT (OUTPATIENT)
Dept: ENDOCRINOLOGY | Age: 68
End: 2018-06-14

## 2018-06-14 VITALS
SYSTOLIC BLOOD PRESSURE: 128 MMHG | HEIGHT: 73 IN | BODY MASS INDEX: 31.54 KG/M2 | DIASTOLIC BLOOD PRESSURE: 78 MMHG | WEIGHT: 238 LBS

## 2018-06-14 DIAGNOSIS — E06.3 HYPOTHYROIDISM DUE TO HASHIMOTO'S THYROIDITIS: ICD-10-CM

## 2018-06-14 DIAGNOSIS — I10 ESSENTIAL HYPERTENSION: ICD-10-CM

## 2018-06-14 DIAGNOSIS — E11.9 TYPE 2 DIABETES MELLITUS WITHOUT COMPLICATION, UNSPECIFIED LONG TERM INSULIN USE STATUS: Primary | ICD-10-CM

## 2018-06-14 DIAGNOSIS — E03.8 HYPOTHYROIDISM DUE TO HASHIMOTO'S THYROIDITIS: ICD-10-CM

## 2018-06-14 DIAGNOSIS — E78.2 MIXED HYPERLIPIDEMIA: ICD-10-CM

## 2018-06-14 DIAGNOSIS — R97.20 ELEVATED PSA: ICD-10-CM

## 2018-06-14 PROCEDURE — 99214 OFFICE O/P EST MOD 30 MIN: CPT | Performed by: NURSE PRACTITIONER

## 2018-06-14 NOTE — PROGRESS NOTES
"Charles Segura Sr. is a 67 y.o. male is here today for follow-up.  Chief Complaint   Patient presents with   • Diabetes     recent labs, pt tests BG occaisonally, pt did not bring meter   • Hypothyroidism   • Hashimoto's Thyroiditis   • Hyperlipidemia   • Hypertension   • Vitamin D Deficiency     /78   Ht 185.4 cm (73\")   Wt 108 kg (238 lb)   BMI 31.40 kg/m²   Current Outpatient Prescriptions on File Prior to Visit   Medication Sig   • amLODIPine (NORVASC) 10 MG tablet Take 1 tablet by mouth Daily.   • aspirin 325 MG tablet Take 325 mg by mouth As Needed.   • atorvastatin (LIPITOR) 40 MG tablet Take 1 tablet by mouth Every Night.   • CALCIUM PO Take by mouth.   • carvedilol CR (COREG CR) 80 MG 24 hr capsule Take 1 capsule by mouth Daily.   • Cholecalciferol (VITAMIN D) 1000 UNITS tablet Take 1,000 Units by mouth Daily.   • Cyanocobalamin (VITAMIN B-12 PO) Take by mouth.   • doxycycline (VIBRAMYCIN) 50 MG capsule Take 1 capsule by mouth daily.   • famotidine (PEPCID) 20 MG tablet Take 1 tablet by mouth Daily.   • Ferrous Sulfate (IRON) 325 (65 FE) MG tablet Take by mouth.   • glipiZIDE (GLUCOTROL XL) 10 MG 24 hr tablet Take 1 tablet by mouth 2 (Two) Times a Day.   • glucose blood test strip OneTouch Ultra Blue In Vitro Strip; Patient Sig: OneTouch Ultra Blue In Vitro Strip TEST 3 TIMES A DAY AND AS NEEDED; 100; 2; 29-May-2013; Active   • levothyroxine (SYNTHROID, LEVOTHROID) 75 MCG tablet Take 1 tablet by mouth daily   • ONE TOUCH LANCETS misc    • pioglitazone-metFORMIN (ACTOPLUS MET)  MG per tablet TAKE 1 TABLET EVERY MORNING AND 2 TABLETS AT NIGHT   • SITagliptin (JANUVIA) 100 MG tablet Take 1 tablet by mouth Daily.   • valsartan (DIOVAN) 320 MG tablet Take 1 tablet by mouth Daily.   • vardenafil (LEVITRA) 20 MG tablet Take 1 tablet by mouth Daily As Needed for erectile dysfunction.   • [DISCONTINUED] Hydrocortisone Butyr Lipo Base 0.1 % cream Apply topically.     No current " facility-administered medications on file prior to visit.      Family History   Problem Relation Age of Onset   • Heart failure Mother    • Diabetes Mother    • Heart disease Mother    • Hypertension Mother    • Thyroid disease Mother    • Diabetes Maternal Grandmother    • Hypertension Maternal Grandmother    • Diabetes Paternal Grandmother      Social History   Substance Use Topics   • Smoking status: Never Smoker   • Smokeless tobacco: Not on file   • Alcohol use No      Comment: stopped     No Known Allergies      History of Present Illness  Encounter Diagnoses   Name Primary?   • Type 2 diabetes mellitus without complication, unspecified long term insulin use status Yes   • Hypothyroidism due to Hashimoto's thyroiditis    • Essential hypertension    • Mixed hyperlipidemia    67-year-old male patient here today for routine follow-up visit.  He is being seen for the above-mentioned problems.  He is taking his medications as prescribed.  He checks his blood sugars all on occasion in the morning and states they're in the 70 range.  He has no complaints at today's visit.  He states he feels good and has good energy.  [facility]  He denies any refills on his medications at today's visit.  He denies any signs and symptoms of hyper or hypothyroidism.    The following portions of the patient's history were reviewed and updated as appropriate: allergies, current medications, past family history, past medical history, past social history, past surgical history and problem list.    Review of Systems   Constitutional: Negative for fatigue.   HENT: Negative for trouble swallowing.    Eyes: Negative for visual disturbance.   Respiratory: Negative for shortness of breath.    Cardiovascular: Negative for leg swelling.   Endocrine: Negative for polyuria.   Skin: Negative for wound.   Neurological: Negative for numbness.       Objective   Physical Exam   Constitutional: He is oriented to  person, place, and time. He appears well-developed and well-nourished. No distress.   HENT:   Head: Normocephalic and atraumatic.   Right Ear: External ear normal.   Left Ear: External ear normal.   Nose: Nose normal.   Mouth/Throat: Oropharynx is clear and moist.   Eyes: Conjunctivae and EOM are normal. Pupils are equal, round, and reactive to light. Right eye exhibits no discharge. Left eye exhibits no discharge. No scleral icterus.   Neck: Normal range of motion. Neck supple. No JVD present. No tracheal deviation present. No thyromegaly present.   Cardiovascular: Normal rate, regular rhythm, normal heart sounds and intact distal pulses.  Exam reveals no gallop and no friction rub.    No murmur heard.  Pulmonary/Chest: Effort normal and breath sounds normal. No stridor. No respiratory distress. He has no wheezes. He has no rales. He exhibits no tenderness.   Abdominal: Soft. Bowel sounds are normal. He exhibits no distension and no mass. There is no tenderness. There is no rebound and no guarding. No hernia.   Musculoskeletal: Normal range of motion. He exhibits no edema, tenderness or deformity.   Lymphadenopathy:     He has no cervical adenopathy.   Neurological: He is alert and oriented to person, place, and time. He has normal reflexes. He displays normal reflexes. No cranial nerve deficit. He exhibits normal muscle tone. Coordination normal.   Skin: Skin is warm. No rash noted. He is not diaphoretic. No erythema. No pallor.   Psychiatric: He has a normal mood and affect. His behavior is normal. Judgment and thought content normal.   Nursing note and vitals reviewed.      Results for orders placed or performed in visit on 05/31/18   Comprehensive Metabolic Panel   Result Value Ref Range    Glucose 113 (H) 65 - 99 mg/dL    BUN 13 8 - 23 mg/dL    Creatinine 1.21 0.76 - 1.27 mg/dL    eGFR Non African Am 60 (L) >60 mL/min/1.73    eGFR African Am 72 >60 mL/min/1.73    BUN/Creatinine Ratio 10.7 7.0 - 25.0    Sodium  141 136 - 145 mmol/L    Potassium 4.1 3.5 - 5.2 mmol/L    Chloride 101 98 - 107 mmol/L    Total CO2 25.8 22.0 - 29.0 mmol/L    Calcium 9.2 8.6 - 10.5 mg/dL    Total Protein 6.1 6.0 - 8.5 g/dL    Albumin 3.90 3.50 - 5.20 g/dL    Globulin 2.2 gm/dL    A/G Ratio 1.8 g/dL    Total Bilirubin 0.5 0.1 - 1.2 mg/dL    Alkaline Phosphatase 60 39 - 117 U/L    AST (SGOT) 12 1 - 40 U/L    ALT (SGPT) 14 1 - 41 U/L   Comprehensive Thyroglobulin   Result Value Ref Range    Thyroglobulin Ab 14 (H) IU/mL    Thyroglobulin Comment ng/mL    Thyroglobulin (TG-[place]) 3.1 ng/mL   C-Peptide   Result Value Ref Range    C-Peptide 2.7 1.1 - 4.4 ng/mL   Hemoglobin & Hematocrit, Blood   Result Value Ref Range    Hemoglobin 13.5 (L) 13.7 - 17.6 g/dL    Hematocrit 41.9 40.4 - 52.2 %   Hemoglobin A1c   Result Value Ref Range    Hemoglobin A1C 6.74 (H) 4.80 - 5.60 %   Lipid Panel   Result Value Ref Range    Total Cholesterol 144 0 - 200 mg/dL    Triglycerides 42 0 - 150 mg/dL    HDL Cholesterol 51 40 - 60 mg/dL    VLDL Cholesterol 8.4 5 - 40 mg/dL    LDL Cholesterol  85 0 - 100 mg/dL   PSA DIAGNOSTIC   Result Value Ref Range    PSA 5.340 (H) 0.000 - 4.000 ng/mL   T3, Free   Result Value Ref Range    T3, Free 2.6 2.0 - 4.4 pg/mL   T4, Free   Result Value Ref Range    Free T4 1.18 0.93 - 1.70 ng/dL   TestT+TestF+SHBG   Result Value Ref Range    Testosterone, Total 504 264 - 916 ng/dL    Testosterone, Free 7.9 6.6 - 18.1 pg/mL    Sex Hormone Binding Globulin 42.0 19.3 - 76.4 nmol/L   Thyroid Panel With TSH   Result Value Ref Range    TSH 2.590 0.450 - 4.500 uIU/mL    T4, Total 7.1 4.5 - 12.0 ug/dL    T3 Uptake 26 24 - 39 %    Free Thyroxine Index 1.8 1.2 - 4.9   Vitamin D 25 Hydroxy   Result Value Ref Range    25 Hydroxy, Vitamin D 37.3 30.0 - 100.0 ng/ml   MicroAlbumin, Urine, Random   Result Value Ref Range    Microalbumin, Urine <3.0 Not Estab. ug/mL   Cardiovascular Risk Assessment   Result Value Ref Range    Interpretation Note    Diabetes Patient  Education   Result Value Ref Range    PDF Image Not applicable        Assessment/Plan   Encounter Diagnoses   Name Primary?   • Type 2 diabetes mellitus without complication, unspecified long term insulin use status Yes   • Hypothyroidism due to Hashimoto's thyroiditis    • Essential hypertension    • Mixed hyperlipidemia    • Elevated PSA      In summary, patient was seen and examined.  His recent labs were reviewed and he was provided a copy.  His labs will be forwarded to [facility] urology who he follows up with for elevated PSA.  He has no complaints at today's visit.  Medications were reviewed.  Hemoglobin A1c reflects his diabetes is well controlled as is his thyroid hormones.  He will follow-up with Dr. Mccabe in 6 months with labs prior.  I've encouraged him to recheck the office should any questions or concerns prior to his next visit.

## 2018-06-18 RX ORDER — PIOGLITAZONE HCL AND METFORMIN HCL 850; 15 MG/1; MG/1
TABLET ORAL
Qty: 270 TABLET | Refills: 0 | Status: SHIPPED | OUTPATIENT
Start: 2018-06-18 | End: 2018-09-14 | Stop reason: SDUPTHER

## 2018-09-17 RX ORDER — PIOGLITAZONE HCL AND METFORMIN HCL 850; 15 MG/1; MG/1
TABLET ORAL
Qty: 270 TABLET | Refills: 0 | Status: SHIPPED | OUTPATIENT
Start: 2018-09-17 | End: 2018-12-14 | Stop reason: SDUPTHER

## 2018-09-24 ENCOUNTER — TELEPHONE (OUTPATIENT)
Dept: ENDOCRINOLOGY | Age: 68
End: 2018-09-24

## 2018-09-24 RX ORDER — LOSARTAN POTASSIUM 100 MG/1
100 TABLET ORAL DAILY
Qty: 90 TABLET | Refills: 1 | Status: SHIPPED | OUTPATIENT
Start: 2018-09-24 | End: 2019-02-01

## 2018-09-24 NOTE — TELEPHONE ENCOUNTER
----- Message from MAURI Rader sent at 9/24/2018  2:52 PM EDT -----  Contact: Patient  I sent rx for losartan  ----- Message -----  From: Rosanna Dougherty MA  Sent: 9/24/2018   2:21 PM  To: MAURI Rader        ----- Message -----  From: Daphne Burton RegSched Rep  Sent: 9/24/2018  11:58 AM  To: Rosanna Dougherty MA    Patient called and stated [facility] wont [facility] patient medication due to the reaction it causes people to have anymore and that patient needed to have us send  in a new prescription of another form of valsartan (DIOVAN) 320 MG tablet called into [facility] - [address] - [phone]  - [phone] (Phone)  [phone] (Fax).     [facility] [phone]        Patient informed of medication change, pt expressed understanding.

## 2018-12-17 RX ORDER — LEVOTHYROXINE SODIUM 0.07 MG/1
TABLET ORAL
Qty: 90 TABLET | Refills: 0 | Status: SHIPPED | OUTPATIENT
Start: 2018-12-17 | End: 2019-02-01 | Stop reason: SDUPTHER

## 2018-12-17 RX ORDER — GLIPIZIDE 10 MG/1
TABLET, FILM COATED, EXTENDED RELEASE ORAL
Qty: 180 TABLET | Refills: 0 | Status: SHIPPED | OUTPATIENT
Start: 2018-12-17 | End: 2019-02-01 | Stop reason: SDUPTHER

## 2018-12-17 RX ORDER — SITAGLIPTIN 100 MG/1
TABLET, FILM COATED ORAL
Qty: 90 TABLET | Refills: 0 | Status: SHIPPED | OUTPATIENT
Start: 2018-12-17 | End: 2019-02-01 | Stop reason: SDUPTHER

## 2018-12-17 RX ORDER — AMLODIPINE BESYLATE 10 MG/1
TABLET ORAL
Qty: 90 TABLET | Refills: 0 | Status: SHIPPED | OUTPATIENT
Start: 2018-12-17 | End: 2019-02-01 | Stop reason: SDUPTHER

## 2018-12-17 RX ORDER — PIOGLITAZONE HCL AND METFORMIN HCL 850; 15 MG/1; MG/1
TABLET ORAL
Qty: 270 TABLET | Refills: 0 | Status: SHIPPED | OUTPATIENT
Start: 2018-12-17 | End: 2019-02-01 | Stop reason: SDUPTHER

## 2018-12-17 RX ORDER — ATORVASTATIN CALCIUM 40 MG/1
TABLET, FILM COATED ORAL
Qty: 90 TABLET | Refills: 0 | Status: SHIPPED | OUTPATIENT
Start: 2018-12-17 | End: 2019-02-01 | Stop reason: SDUPTHER

## 2018-12-17 RX ORDER — CARVEDILOL PHOSPHATE 80 MG/1
CAPSULE, EXTENDED RELEASE ORAL
Qty: 90 CAPSULE | Refills: 0 | Status: SHIPPED | OUTPATIENT
Start: 2018-12-17 | End: 2019-02-01 | Stop reason: SDUPTHER

## 2019-01-18 ENCOUNTER — LAB (OUTPATIENT)
Dept: ENDOCRINOLOGY | Age: 69
End: 2019-01-18

## 2019-01-18 DIAGNOSIS — E55.9 VITAMIN D DEFICIENCY: ICD-10-CM

## 2019-01-18 DIAGNOSIS — E06.3 HYPOTHYROIDISM DUE TO HASHIMOTO'S THYROIDITIS: ICD-10-CM

## 2019-01-18 DIAGNOSIS — I10 ESSENTIAL HYPERTENSION: ICD-10-CM

## 2019-01-18 DIAGNOSIS — E11.8 CONTROLLED TYPE 2 DIABETES MELLITUS WITH COMPLICATION, WITHOUT LONG-TERM CURRENT USE OF INSULIN (HCC): ICD-10-CM

## 2019-01-18 DIAGNOSIS — E78.2 MIXED HYPERLIPIDEMIA: ICD-10-CM

## 2019-01-18 DIAGNOSIS — E03.8 HYPOTHYROIDISM DUE TO HASHIMOTO'S THYROIDITIS: ICD-10-CM

## 2019-01-19 LAB
25(OH)D3+25(OH)D2 SERPL-MCNC: 42.7 NG/ML (ref 30–100)
ALBUMIN SERPL-MCNC: 3.6 G/DL (ref 3.5–5.2)
ALBUMIN/GLOB SERPL: 1.4 G/DL
ALP SERPL-CCNC: 55 U/L (ref 39–117)
ALT SERPL-CCNC: 16 U/L (ref 1–41)
AST SERPL-CCNC: 15 U/L (ref 1–40)
BILIRUB SERPL-MCNC: 0.5 MG/DL (ref 0.1–1.2)
BUN SERPL-MCNC: 13 MG/DL (ref 8–23)
BUN/CREAT SERPL: 11.4 (ref 7–25)
C PEPTIDE SERPL-MCNC: 3.2 NG/ML (ref 1.1–4.4)
CALCIUM SERPL-MCNC: 9.4 MG/DL (ref 8.6–10.5)
CHLORIDE SERPL-SCNC: 104 MMOL/L (ref 98–107)
CHOLEST SERPL-MCNC: 148 MG/DL (ref 0–200)
CO2 SERPL-SCNC: 28.1 MMOL/L (ref 22–29)
CREAT SERPL-MCNC: 1.14 MG/DL (ref 0.76–1.27)
GLOBULIN SER CALC-MCNC: 2.6 GM/DL
GLUCOSE SERPL-MCNC: 104 MG/DL (ref 65–99)
HBA1C MFR BLD: 6.6 % (ref 4.8–5.6)
HDLC SERPL-MCNC: 53 MG/DL (ref 40–60)
INTERPRETATION: NORMAL
LDLC SERPL CALC-MCNC: 88 MG/DL (ref 0–100)
Lab: NORMAL
POTASSIUM SERPL-SCNC: 4 MMOL/L (ref 3.5–5.2)
PROT SERPL-MCNC: 6.2 G/DL (ref 6–8.5)
SODIUM SERPL-SCNC: 144 MMOL/L (ref 136–145)
T4 SERPL-MCNC: 6.49 MCG/DL (ref 4.5–11.7)
TRIGL SERPL-MCNC: 36 MG/DL (ref 0–150)
TSH SERPL DL<=0.005 MIU/L-ACNC: 2.3 MIU/ML (ref 0.27–4.2)
UNABLE TO VOID: NORMAL
URATE SERPL-MCNC: 4.1 MG/DL (ref 3.4–7)
VLDLC SERPL CALC-MCNC: 7.2 MG/DL (ref 5–40)

## 2019-02-01 ENCOUNTER — OFFICE VISIT (OUTPATIENT)
Dept: ENDOCRINOLOGY | Age: 69
End: 2019-02-01

## 2019-02-01 VITALS
WEIGHT: 233 LBS | DIASTOLIC BLOOD PRESSURE: 72 MMHG | BODY MASS INDEX: 30.88 KG/M2 | RESPIRATION RATE: 16 BRPM | HEIGHT: 73 IN | SYSTOLIC BLOOD PRESSURE: 112 MMHG

## 2019-02-01 DIAGNOSIS — E78.2 MIXED HYPERLIPIDEMIA: ICD-10-CM

## 2019-02-01 DIAGNOSIS — E11.8 CONTROLLED TYPE 2 DIABETES MELLITUS WITH COMPLICATION, WITHOUT LONG-TERM CURRENT USE OF INSULIN (HCC): Primary | ICD-10-CM

## 2019-02-01 DIAGNOSIS — N52.9 IMPOTENCE OF ORGANIC ORIGIN: ICD-10-CM

## 2019-02-01 DIAGNOSIS — E03.8 HYPOTHYROIDISM DUE TO HASHIMOTO'S THYROIDITIS: ICD-10-CM

## 2019-02-01 DIAGNOSIS — I10 ESSENTIAL HYPERTENSION: ICD-10-CM

## 2019-02-01 DIAGNOSIS — E55.9 VITAMIN D DEFICIENCY: ICD-10-CM

## 2019-02-01 DIAGNOSIS — E06.3 HYPOTHYROIDISM DUE TO HASHIMOTO'S THYROIDITIS: ICD-10-CM

## 2019-02-01 PROCEDURE — 99214 OFFICE O/P EST MOD 30 MIN: CPT | Performed by: INTERNAL MEDICINE

## 2019-02-01 RX ORDER — IRBESARTAN 300 MG/1
300 TABLET ORAL DAILY
Qty: 90 TABLET | Refills: 3 | Status: SHIPPED | OUTPATIENT
Start: 2019-02-01 | End: 2019-04-03

## 2019-02-01 RX ORDER — LEVOTHYROXINE SODIUM 0.07 MG/1
TABLET ORAL
Qty: 90 TABLET | Refills: 3 | Status: SHIPPED | OUTPATIENT
Start: 2019-02-01 | End: 2020-01-31

## 2019-02-01 RX ORDER — PIOGLITAZONE HCL AND METFORMIN HCL 850; 15 MG/1; MG/1
TABLET ORAL
Qty: 270 TABLET | Refills: 3 | Status: SHIPPED | OUTPATIENT
Start: 2019-02-01 | End: 2020-01-31

## 2019-02-01 RX ORDER — SITAGLIPTIN 100 MG/1
100 TABLET, FILM COATED ORAL DAILY
Qty: 90 TABLET | Refills: 3 | Status: SHIPPED | OUTPATIENT
Start: 2019-02-01 | End: 2020-01-31

## 2019-02-01 RX ORDER — CARVEDILOL PHOSPHATE 80 MG/1
80 CAPSULE, EXTENDED RELEASE ORAL DAILY
Qty: 90 CAPSULE | Refills: 3 | Status: SHIPPED | OUTPATIENT
Start: 2019-02-01 | End: 2020-01-31

## 2019-02-01 RX ORDER — VARDENAFIL HYDROCHLORIDE 20 MG/1
20 TABLET ORAL DAILY PRN
Qty: 24 TABLET | Refills: 3 | Status: SHIPPED | OUTPATIENT
Start: 2019-02-01 | End: 2019-08-08

## 2019-02-01 RX ORDER — AMLODIPINE BESYLATE 10 MG/1
10 TABLET ORAL DAILY
Qty: 90 TABLET | Refills: 3 | Status: SHIPPED | OUTPATIENT
Start: 2019-02-01 | End: 2020-01-31

## 2019-02-01 RX ORDER — GLIPIZIDE 10 MG/1
TABLET, FILM COATED, EXTENDED RELEASE ORAL
Qty: 180 TABLET | Refills: 3 | Status: SHIPPED | OUTPATIENT
Start: 2019-02-01 | End: 2020-01-31

## 2019-02-01 RX ORDER — ATORVASTATIN CALCIUM 40 MG/1
40 TABLET, FILM COATED ORAL DAILY
Qty: 90 TABLET | Refills: 3 | Status: SHIPPED | OUTPATIENT
Start: 2019-02-01 | End: 2020-01-31

## 2019-02-01 NOTE — PROGRESS NOTES
"Charles Segura Sr. is a 68 y.o. male seen for follow up for DM2, HTN, ED, hypothyroidism, vit d deficiency, lab review. Patient is checking BG as needed. Patient denies any problems or concerns.     History of Present Illness this is a 68-year-old gentleman known patient with type II diabetes hypertension and dyslipidemia as well as hypothyroidism and erectile dysfunction.  Over the course of last 6 months he has had no significant health problems for which to go to the emergency room or hospital.    /72   Resp 16   Ht 185.4 cm (73\")   Wt 106 kg (233 lb)   BMI 30.74 kg/m²      No Known Allergies    Current Outpatient Medications:   •  amLODIPine (NORVASC) 10 MG tablet, TAKE 1 TABLET DAILY, Disp: 90 tablet, Rfl: 0  •  aspirin 325 MG tablet, Take 325 mg by mouth As Needed., Disp: , Rfl:   •  atorvastatin (LIPITOR) 40 MG tablet, TAKE 1 TABLET EVERY NIGHT, Disp: 90 tablet, Rfl: 0  •  CALCIUM PO, Take by mouth., Disp: , Rfl:   •  carvedilol CR (COREG CR) 80 MG 24 hr capsule, TAKE 1 CAPSULE DAILY, Disp: 90 capsule, Rfl: 0  •  Cholecalciferol (VITAMIN D) 1000 UNITS tablet, Take 1,000 Units by mouth Daily., Disp: , Rfl:   •  Cyanocobalamin (VITAMIN B-12 PO), Take by mouth., Disp: , Rfl:   •  doxycycline (VIBRAMYCIN) 50 MG capsule, Take 1 capsule by mouth daily., Disp: , Rfl:   •  famotidine (PEPCID) 20 MG tablet, Take 1 tablet by mouth Daily., Disp: , Rfl:   •  Ferrous Sulfate (IRON) 325 (65 FE) MG tablet, Take by mouth., Disp: , Rfl:   •  glipiZIDE (GLUCOTROL XL) 10 MG 24 hr tablet, TAKE 1 TABLET TWICE A DAY, Disp: 180 tablet, Rfl: 0  •  glucose blood test strip, OneTouch Ultra Blue In Vitro Strip; Patient Sig: OneTouch Ultra Blue In Vitro Strip TEST 3 TIMES A DAY AND AS NEEDED; 100; 2; 29-May-2013; Active, Disp: , Rfl:   •  JANUVIA 100 MG tablet, TAKE 1 TABLET DAILY, Disp: 90 tablet, Rfl: 0  •  levothyroxine (SYNTHROID, LEVOTHROID) 75 MCG tablet, TAKE 1 TABLET DAILY, Disp: 90 tablet, Rfl: 0  •  " losartan (COZAAR) 100 MG tablet, Take 1 tablet by mouth Daily., Disp: 90 tablet, Rfl: 1  •  ONE TOUCH LANCETS misc, , Disp: , Rfl:   •  pioglitazone-metFORMIN (ACTOPLUS MET)  MG per tablet, TAKE 1 TABLET EVERY MORNING AND 2 TABLETS AT NIGHT, Disp: 270 tablet, Rfl: 0  •  vardenafil (LEVITRA) 20 MG tablet, Take 1 tablet by mouth Daily As Needed for erectile dysfunction., Disp: 24 tablet, Rfl: 3      The following portions of the patient's history were reviewed and updated as appropriate: allergies, current medications, past family history, past medical history, past social history, past surgical history and problem list.    Review of Systems   Constitutional: Negative.    HENT: Negative.    Eyes: Negative.    Respiratory: Negative.    Cardiovascular: Negative.    Gastrointestinal: Negative.    Endocrine: Negative.    Genitourinary: Negative.    Musculoskeletal: Negative.    Skin: Negative.    Allergic/Immunologic: Negative.    Neurological: Negative.    Hematological: Negative.    Psychiatric/Behavioral: Negative.        Objective   Physical Exam   Constitutional: He is oriented to person, place, and time. He appears well-developed and well-nourished. No distress.   HENT:   Head: Normocephalic and atraumatic.   Right Ear: External ear normal.   Left Ear: External ear normal.   Nose: Nose normal.   Mouth/Throat: Oropharynx is clear and moist. No oropharyngeal exudate.   Eyes: Conjunctivae and EOM are normal. Pupils are equal, round, and reactive to light. Right eye exhibits no discharge. Left eye exhibits no discharge. No scleral icterus.   Neck: Normal range of motion. Neck supple. No JVD present. No tracheal deviation present. No thyromegaly present.   Cardiovascular: Normal rate, regular rhythm, normal heart sounds and intact distal pulses. Exam reveals no gallop and no friction rub.   No murmur heard.  Pulmonary/Chest: Effort normal and breath sounds normal. No stridor. No respiratory distress. He has no  wheezes. He has no rales. He exhibits no tenderness.   Abdominal: Soft. Bowel sounds are normal. He exhibits no distension and no mass. There is no tenderness. There is no rebound and no guarding. No hernia.   Musculoskeletal: Normal range of motion. He exhibits no edema, tenderness or deformity.   Lymphadenopathy:     He has no cervical adenopathy.   Neurological: He is alert and oriented to person, place, and time. He has normal reflexes. He displays normal reflexes. No cranial nerve deficit or sensory deficit. He exhibits normal muscle tone. Coordination normal.   Skin: Skin is warm. No rash noted. He is not diaphoretic. No erythema. No pallor.   Psychiatric: He has a normal mood and affect. His behavior is normal. Judgment and thought content normal.   Nursing note and vitals reviewed.       Results for orders placed or performed in visit on 06/06/18   T4 & TSH ([facility])   Result Value Ref Range    TSH 2.300 0.270 - 4.200 mIU/mL    T4, Total 6.49 4.50 - 11.70 mcg/dL   Uric Acid   Result Value Ref Range    Uric Acid 4.1 3.4 - 7.0 mg/dL   Vitamin D 25 Hydroxy   Result Value Ref Range    25 Hydroxy, Vitamin D 42.7 30.0 - 100.0 ng/ml   Comprehensive Metabolic Panel   Result Value Ref Range    Glucose 104 (H) 65 - 99 mg/dL    BUN 13 8 - 23 mg/dL    Creatinine 1.14 0.76 - 1.27 mg/dL    eGFR Non African Am 64 >60 mL/min/1.73    eGFR African Am 77 >60 mL/min/1.73    BUN/Creatinine Ratio 11.4 7.0 - 25.0    Sodium 144 136 - 145 mmol/L    Potassium 4.0 3.5 - 5.2 mmol/L    Chloride 104 98 - 107 mmol/L    Total CO2 28.1 22.0 - 29.0 mmol/L    Calcium 9.4 8.6 - 10.5 mg/dL    Total Protein 6.2 6.0 - 8.5 g/dL    Albumin 3.60 3.50 - 5.20 g/dL    Globulin 2.6 gm/dL    A/G Ratio 1.4 g/dL    Total Bilirubin 0.5 0.1 - 1.2 mg/dL    Alkaline Phosphatase 55 39 - 117 U/L    AST (SGOT) 15 1 - 40 U/L    ALT (SGPT) 16 1 - 41 U/L   C-Peptide   Result Value Ref Range    C-Peptide 3.2 1.1 - 4.4 ng/mL   Hemoglobin A1c   Result Value Ref Range     Hemoglobin A1C 6.60 (H) 4.80 - 5.60 %   Lipid Panel   Result Value Ref Range    Total Cholesterol 148 0 - 200 mg/dL    Triglycerides 36 0 - 150 mg/dL    HDL Cholesterol 53 40 - 60 mg/dL    VLDL Cholesterol 7.2 5 - 40 mg/dL    LDL Cholesterol  88 0 - 100 mg/dL   Unable To Void   Result Value Ref Range    Unable to Void Comment    Cardiovascular Risk Assessment   Result Value Ref Range    Interpretation Note    Diabetes Patient Education   Result Value Ref Range    PDF Image Not applicable          Assessment/Plan   Diagnoses and all orders for this visit:    Controlled type 2 diabetes mellitus with complication, without long-term current use of insulin (CMS/[facility])  -     T4 & TSH ([facility]); Future  -     T3, Free; Future  -     T4, Free; Future  -     Uric Acid; Future  -     Vitamin D 25 Hydroxy; Future  -     Comprehensive Metabolic Panel; Future  -     C-Peptide; Future  -     Hemoglobin A1c; Future  -     Lipid Panel; Future  -     MicroAlbumin, Urine, Random - Urine, Clean Catch; Future    Mixed hyperlipidemia  -     T4 & TSH ([facility]); Future  -     T3, Free; Future  -     T4, Free; Future  -     Uric Acid; Future  -     Vitamin D 25 Hydroxy; Future  -     Comprehensive Metabolic Panel; Future  -     C-Peptide; Future  -     Hemoglobin A1c; Future  -     Lipid Panel; Future  -     MicroAlbumin, Urine, Random - Urine, Clean Catch; Future    Essential hypertension  -     T4 & TSH ([facility]); Future  -     T3, Free; Future  -     T4, Free; Future  -     Uric Acid; Future  -     Vitamin D 25 Hydroxy; Future  -     Comprehensive Metabolic Panel; Future  -     C-Peptide; Future  -     Hemoglobin A1c; Future  -     Lipid Panel; Future  -     MicroAlbumin, Urine, Random - Urine, Clean Catch; Future    Vitamin D deficiency  -     T4 & TSH ([facility]); Future  -     T3, Free; Future  -     T4, Free; Future  -     Uric Acid; Future  -     Vitamin D 25 Hydroxy; Future  -     Comprehensive Metabolic Panel; Future  -      C-Peptide; Future  -     Hemoglobin A1c; Future  -     Lipid Panel; Future  -     MicroAlbumin, Urine, Random - Urine, Clean Catch; Future    Hypothyroidism due to Hashimoto's thyroiditis  -     T4 & TSH ([facility]); Future  -     T3, Free; Future  -     T4, Free; Future  -     Uric Acid; Future  -     Vitamin D 25 Hydroxy; Future  -     Comprehensive Metabolic Panel; Future  -     C-Peptide; Future  -     Hemoglobin A1c; Future  -     Lipid Panel; Future  -     MicroAlbumin, Urine, Random - Urine, Clean Catch; Future    Impotence of organic origin  -     T4 & TSH ([facility]); Future  -     T3, Free; Future  -     T4, Free; Future  -     Uric Acid; Future  -     Vitamin D 25 Hydroxy; Future  -     Comprehensive Metabolic Panel; Future  -     C-Peptide; Future  -     Hemoglobin A1c; Future  -     Lipid Panel; Future  -     MicroAlbumin, Urine, Random - Urine, Clean Catch; Future    Other orders  -     vardenafil (LEVITRA) 20 MG tablet; Take 1 tablet by mouth Daily As Needed for erectile dysfunction.  -     pioglitazone-metFORMIN (ACTOPLUS MET)  MG per tablet; Tablet in the morning and 2 tablets in the evening.  -     levothyroxine (SYNTHROID, LEVOTHROID) 75 MCG tablet; TAKE 1 TABLET DAILY  -     JANUVIA 100 MG tablet; Take 1 tablet by mouth Daily.  -     glipiZIDE (GLUCOTROL XL) 10 MG 24 hr tablet; 1 tablet twice daily  -     carvedilol CR (COREG CR) 80 MG 24 hr capsule; Take 1 capsule by mouth Daily.  -     atorvastatin (LIPITOR) 40 MG tablet; Take 1 tablet by mouth Daily.  -     amLODIPine (NORVASC) 10 MG tablet; Take 1 tablet by mouth Daily.  -     irbesartan (AVAPRO) 300 MG tablet; Take 1 tablet by mouth Daily.             in summary I saw and examined this 68-year-old gentleman for above-mentioned problems.  I reviewed his laboratory evaluation of 01/18/2019 and provided him with a hard copy of it.  Overall he is clinically and metabolically stable and therefore we will go ahead and continue all his current  prescriptions.  He will see Ms. Brie Fraser in 6 months or sooner if needed with laboratory evaluation prior to each office visit.

## 2019-04-03 RX ORDER — AZILSARTAN KAMEDOXOMIL 80 MG/1
80 TABLET ORAL DAILY
Qty: 90 TABLET | Refills: 3 | Status: SHIPPED | OUTPATIENT
Start: 2019-04-03 | End: 2020-01-31

## 2019-07-02 RX ORDER — BLOOD-GLUCOSE METER
EACH MISCELLANEOUS
Qty: 1 EACH | Refills: 1 | Status: SHIPPED | OUTPATIENT
Start: 2019-07-02 | End: 2019-07-16 | Stop reason: SDUPTHER

## 2019-07-16 RX ORDER — BLOOD-GLUCOSE METER
EACH MISCELLANEOUS
Qty: 1 EACH | Refills: 1 | Status: SHIPPED | OUTPATIENT
Start: 2019-07-16 | End: 2019-07-26 | Stop reason: CLARIF

## 2019-07-25 ENCOUNTER — LAB (OUTPATIENT)
Dept: ENDOCRINOLOGY | Age: 69
End: 2019-07-25

## 2019-07-25 ENCOUNTER — RESULTS ENCOUNTER (OUTPATIENT)
Dept: ENDOCRINOLOGY | Age: 69
End: 2019-07-25

## 2019-07-25 DIAGNOSIS — E78.2 MIXED HYPERLIPIDEMIA: ICD-10-CM

## 2019-07-25 DIAGNOSIS — E03.8 HYPOTHYROIDISM DUE TO HASHIMOTO'S THYROIDITIS: ICD-10-CM

## 2019-07-25 DIAGNOSIS — E55.9 VITAMIN D DEFICIENCY: ICD-10-CM

## 2019-07-25 DIAGNOSIS — E11.8 CONTROLLED TYPE 2 DIABETES MELLITUS WITH COMPLICATION, WITHOUT LONG-TERM CURRENT USE OF INSULIN (HCC): ICD-10-CM

## 2019-07-25 DIAGNOSIS — E06.3 HYPOTHYROIDISM DUE TO HASHIMOTO'S THYROIDITIS: ICD-10-CM

## 2019-07-25 DIAGNOSIS — E11.8 CONTROLLED TYPE 2 DIABETES MELLITUS WITH COMPLICATION, WITHOUT LONG-TERM CURRENT USE OF INSULIN (HCC): Primary | ICD-10-CM

## 2019-07-25 DIAGNOSIS — N52.9 IMPOTENCE OF ORGANIC ORIGIN: ICD-10-CM

## 2019-07-25 DIAGNOSIS — I10 ESSENTIAL HYPERTENSION: ICD-10-CM

## 2019-07-26 RX ORDER — BLOOD-GLUCOSE METER
1 EACH MISCELLANEOUS DAILY
Qty: 1 KIT | Refills: 1 | Status: SHIPPED | OUTPATIENT
Start: 2019-07-26 | End: 2021-11-04 | Stop reason: CLARIF

## 2019-07-26 RX ORDER — LANCETS
EACH MISCELLANEOUS
Qty: 300 EACH | Refills: 0 | Status: SHIPPED | OUTPATIENT
Start: 2019-07-26

## 2019-07-26 RX ORDER — BLOOD SUGAR DIAGNOSTIC
STRIP MISCELLANEOUS
Qty: 300 EACH | Refills: 0 | Status: SHIPPED | OUTPATIENT
Start: 2019-07-26 | End: 2021-11-01 | Stop reason: SDUPTHER

## 2019-08-01 LAB
25(OH)D3+25(OH)D2 SERPL-MCNC: 44.1 NG/ML (ref 30–100)
ALBUMIN SERPL-MCNC: 4.3 G/DL (ref 3.5–5.2)
ALBUMIN/GLOB SERPL: 2.2 G/DL
ALP SERPL-CCNC: 65 U/L (ref 39–117)
ALT SERPL-CCNC: 14 U/L (ref 1–41)
AST SERPL-CCNC: 16 U/L (ref 1–40)
BILIRUB SERPL-MCNC: 0.4 MG/DL (ref 0.2–1.2)
BUN SERPL-MCNC: 13 MG/DL (ref 8–23)
BUN/CREAT SERPL: 11.2 (ref 7–25)
C PEPTIDE SERPL-MCNC: 2.4 NG/ML (ref 1.1–4.4)
CALCIUM SERPL-MCNC: 9.1 MG/DL (ref 8.6–10.5)
CHLORIDE SERPL-SCNC: 105 MMOL/L (ref 98–107)
CHOLEST SERPL-MCNC: 155 MG/DL (ref 0–200)
CO2 SERPL-SCNC: 28.3 MMOL/L (ref 22–29)
CREAT SERPL-MCNC: 1.16 MG/DL (ref 0.76–1.27)
FT4I SERPL CALC-MCNC: 2 (ref 1.2–4.9)
GLOBULIN SER CALC-MCNC: 2 GM/DL
GLUCOSE SERPL-MCNC: 90 MG/DL (ref 65–99)
HBA1C MFR BLD: 6.6 % (ref 4.8–5.6)
HDLC SERPL-MCNC: 52 MG/DL (ref 40–60)
INTERPRETATION: NORMAL
LDLC SERPL CALC-MCNC: 93 MG/DL (ref 0–100)
Lab: NORMAL
MICROALBUMIN UR-MCNC: <3 UG/ML
POTASSIUM SERPL-SCNC: 4.5 MMOL/L (ref 3.5–5.2)
PROT SERPL-MCNC: 6.3 G/DL (ref 6–8.5)
SODIUM SERPL-SCNC: 145 MMOL/L (ref 136–145)
T3FREE SERPL-MCNC: 2.9 PG/ML (ref 2–4.4)
T3RU NFR SERPL: 26 % (ref 24–39)
T4 FREE SERPL-MCNC: 1.15 NG/DL (ref 0.93–1.7)
T4 SERPL-MCNC: 7.6 UG/DL (ref 4.5–12)
THYROGLOB AB SERPL-ACNC: 31 IU/ML
THYROGLOB SERPL-MCNC: 3 NG/ML
THYROGLOB SERPL-MCNC: ABNORMAL NG/ML
TRIGL SERPL-MCNC: 48 MG/DL (ref 0–150)
TSH SERPL DL<=0.005 MIU/L-ACNC: 4.15 UIU/ML (ref 0.45–4.5)
VLDLC SERPL CALC-MCNC: 9.6 MG/DL

## 2019-08-08 ENCOUNTER — OFFICE VISIT (OUTPATIENT)
Dept: ENDOCRINOLOGY | Age: 69
End: 2019-08-08

## 2019-08-08 VITALS
BODY MASS INDEX: 31.28 KG/M2 | HEIGHT: 73 IN | WEIGHT: 236 LBS | DIASTOLIC BLOOD PRESSURE: 80 MMHG | SYSTOLIC BLOOD PRESSURE: 136 MMHG

## 2019-08-08 DIAGNOSIS — E55.9 VITAMIN D DEFICIENCY: ICD-10-CM

## 2019-08-08 DIAGNOSIS — E03.8 HYPOTHYROIDISM DUE TO HASHIMOTO'S THYROIDITIS: ICD-10-CM

## 2019-08-08 DIAGNOSIS — E11.8 CONTROLLED TYPE 2 DIABETES MELLITUS WITH COMPLICATION, WITHOUT LONG-TERM CURRENT USE OF INSULIN (HCC): Primary | ICD-10-CM

## 2019-08-08 DIAGNOSIS — E06.3 HYPOTHYROIDISM DUE TO HASHIMOTO'S THYROIDITIS: ICD-10-CM

## 2019-08-08 DIAGNOSIS — E78.2 MIXED HYPERLIPIDEMIA: ICD-10-CM

## 2019-08-08 DIAGNOSIS — I10 ESSENTIAL HYPERTENSION: ICD-10-CM

## 2019-08-08 PROCEDURE — 99214 OFFICE O/P EST MOD 30 MIN: CPT | Performed by: NURSE PRACTITIONER

## 2019-08-08 RX ORDER — ASPIRIN 81 MG/1
81 TABLET ORAL DAILY
COMMUNITY

## 2019-08-08 NOTE — PROGRESS NOTES
"Charles Segura Sr. is a 68 y.o. male is here today for follow-up.  Chief Complaint   Patient presents with   • Diabetes     recent labs, testing BG irregularly, pt did not bring meter   • Hyperlipidemia   • Hypertension   • Hypothyroidism     /80   Ht 185.4 cm (73\")   Wt 107 kg (236 lb)   BMI 31.14 kg/m²   Current Outpatient Medications on File Prior to Visit   Medication Sig   • ACCU-CHEK GUIDE test strip 3 times a day   • amLODIPine (NORVASC) 10 MG tablet Take 1 tablet by mouth Daily.   • aspirin 81 MG EC tablet Take 81 mg by mouth Daily.   • atorvastatin (LIPITOR) 40 MG tablet Take 1 tablet by mouth Daily.   • Blood Glucose Monitoring Suppl (ACCU-CHEK GUIDE) w/Device kit 1 kit Daily.   • CALCIUM PO Take by mouth.   • carvedilol CR (COREG CR) 80 MG 24 hr capsule Take 1 capsule by mouth Daily.   • Cholecalciferol (VITAMIN D) 1000 UNITS tablet Take 1,000 Units by mouth Daily.   • Cyanocobalamin (VITAMIN B-12 PO) Take by mouth.   • EDARBI 80 MG tablet tablet Take 1 tablet by mouth Daily.   • famotidine (PEPCID) 20 MG tablet Take 1 tablet by mouth Daily.   • Ferrous Sulfate (IRON) 325 (65 FE) MG tablet Take by mouth.   • glipiZIDE (GLUCOTROL XL) 10 MG 24 hr tablet 1 tablet twice daily   • JANUVIA 100 MG tablet Take 1 tablet by mouth Daily.   • Lancets (ACCU-CHEK MULTICLIX) lancets 3 times a day   • levothyroxine (SYNTHROID, LEVOTHROID) 75 MCG tablet TAKE 1 TABLET DAILY   • pioglitazone-metFORMIN (ACTOPLUS MET)  MG per tablet Tablet in the morning and 2 tablets in the evening.   • [DISCONTINUED] vardenafil (LEVITRA) 20 MG tablet Take 1 tablet by mouth Daily As Needed for erectile dysfunction.   • [DISCONTINUED] aspirin 325 MG tablet Take 325 mg by mouth As Needed.   • [DISCONTINUED] doxycycline (VIBRAMYCIN) 50 MG capsule Take 1 capsule by mouth daily.     No current facility-administered medications on file prior to visit.      Family History   Problem Relation Age of Onset   • Heart " failure Mother    • Diabetes Mother    • Heart disease Mother    • Hypertension Mother    • Thyroid disease Mother    • Diabetes Maternal Grandmother    • Hypertension Maternal Grandmother    • Diabetes Paternal Grandmother      Social History     Tobacco Use   • Smoking status: Never Smoker   Substance Use Topics   • Alcohol use: No     Comment: stopped   • Drug use: Not on file     No Known Allergies      History of Present Illness  Encounter Diagnoses   Name Primary?   • Controlled type 2 diabetes mellitus with complication, without long-term current use of insulin (CMS/[facility]) Yes   • Mixed hyperlipidemia    • Essential hypertension    • Hypothyroidism due to Hashimoto's thyroiditis    • Vitamin D deficiency    68-year-old male patient here today for routine follow-up visit.  He is being seen for the above diagnoses.  He is taking his medications daily and consistently as prescribed.  He had labs done prior to today's visit which were reviewed and he was provided a copy.  He is got an appointment scheduled to have his right cataract removed and he will be scheduled to have his left cataract removed.  He does have abnormal vision as a result of the cataracts.  He had heart palpitations a few months ago and was checked out by cardiologist.  He is not exercising like he should.  We discussed diet exercise for weight loss.  He denies any signs and symptoms associated with uncontrolled diabetes or with hyper or hypo-thyroidism.    The following portions of the patient's history were reviewed and updated as appropriate: allergies, current medications, past family history, past medical history, past social history, past surgical history and problem list.    Review of Systems   Constitutional: Positive for fatigue.   Eyes: Positive for visual disturbance.        Has cataracts both eyes scheduled for removal    Endocrine: Negative.    Skin: Negative.    Hematological: Negative.      .[id number]    Objective   Physical Exam    Constitutional: He is oriented to person, place, and time. He appears well-developed and well-nourished. No distress.   HENT:   Head: Normocephalic and atraumatic.   Right Ear: External ear normal.   Left Ear: External ear normal.   Nose: Nose normal.   Mouth/Throat: Oropharynx is clear and moist.   Eyes: Conjunctivae and EOM are normal. Pupils are equal, round, and reactive to light. Right eye exhibits no discharge. Left eye exhibits no discharge. No scleral icterus.   Neck: Normal range of motion. Neck supple. No JVD present. No tracheal deviation present. No thyromegaly present.   Cardiovascular: Normal rate, regular rhythm, normal heart sounds and intact distal pulses. Exam reveals no gallop and no friction rub.   No murmur heard.  Pulmonary/Chest: Effort normal and breath sounds normal. No stridor. No respiratory distress. He has no wheezes. He has no rales. He exhibits no tenderness.   Abdominal: Soft. Bowel sounds are normal. He exhibits no distension and no mass. There is no tenderness. There is no rebound and no guarding. No hernia.   Musculoskeletal: Normal range of motion. He exhibits no edema, tenderness or deformity.   Lymphadenopathy:     He has no cervical adenopathy.   Neurological: He is alert and oriented to person, place, and time. He has normal reflexes. He displays normal reflexes. No cranial nerve deficit. He exhibits normal muscle tone. Coordination normal.   Skin: Skin is warm. No rash noted. He is not diaphoretic. No erythema. No pallor.   Psychiatric: He has a normal mood and affect. His behavior is normal. Judgment and thought content normal.   Nursing note and vitals reviewed.      Results for orders placed or performed in visit on 07/25/19   Comprehensive Thyroglobulin   Result Value Ref Range    Thyroglobulin Ab 31 (H) IU/mL    Thyroglobulin Comment ng/mL    Thyroglobulin (TG[place]) 3.0 ng/mL   Thyroid Panel With TSH   Result Value Ref Range    TSH 4.150 0.450 - 4.500 uIU/mL    T4,  Total 7.6 4.5 - 12.0 ug/dL    T3 Uptake 26 24 - 39 %    Free Thyroxine Index 2.0 1.2 - 4.9   T4, Free   Result Value Ref Range    Free T4 1.15 0.93 - 1.70 ng/dL   T3, Free   Result Value Ref Range    T3, Free 2.9 2.0 - 4.4 pg/mL   C-Peptide   Result Value Ref Range    C-Peptide 2.4 1.1 - 4.4 ng/mL   Hemoglobin A1c   Result Value Ref Range    Hemoglobin A1C 6.60 (H) 4.80 - 5.60 %   Vitamin D 25 Hydroxy   Result Value Ref Range    25 Hydroxy, Vitamin D 44.1 30.0 - 100.0 ng/ml   Lipid Panel   Result Value Ref Range    Total Cholesterol 155 0 - 200 mg/dL    Triglycerides 48 0 - 150 mg/dL    HDL Cholesterol 52 40 - 60 mg/dL    VLDL Cholesterol 9.6 mg/dL    LDL Cholesterol  93 0 - 100 mg/dL   Comprehensive Metabolic Panel   Result Value Ref Range    Glucose 90 65 - 99 mg/dL    BUN 13 8 - 23 mg/dL    Creatinine 1.16 0.76 - 1.27 mg/dL    eGFR Non African Am 63 >60 mL/min/1.73    eGFR African Am 76 >60 mL/min/1.73    BUN/Creatinine Ratio 11.2 7.0 - 25.0    Sodium 145 136 - 145 mmol/L    Potassium 4.5 3.5 - 5.2 mmol/L    Chloride 105 98 - 107 mmol/L    Total CO2 28.3 22.0 - 29.0 mmol/L    Calcium 9.1 8.6 - 10.5 mg/dL    Total Protein 6.3 6.0 - 8.5 g/dL    Albumin 4.30 3.50 - 5.20 g/dL    Globulin 2.0 gm/dL    A/G Ratio 2.2 g/dL    Total Bilirubin 0.4 0.2 - 1.2 mg/dL    Alkaline Phosphatase 65 39 - 117 U/L    AST (SGOT) 16 1 - 40 U/L    ALT (SGPT) 14 1 - 41 U/L   MicroAlbumin, Urine, Random -   Result Value Ref Range    Microalbumin, Urine <3.0 Not Estab. ug/mL   Cardiovascular Risk Assessment   Result Value Ref Range    Interpretation Note    Diabetes Patient Education   Result Value Ref Range    PDF Image Not applicable        Assessment/Plan   Problems Addressed this Visit        Cardiovascular and Mediastinum    Hyperlipidemia    Hypertension       Digestive    Vitamin D deficiency       Endocrine    Hypothyroidism    Controlled type 2 diabetes mellitus with complication, without long-term current use of insulin (CMS/HCC)  - Primary        In summary, patient was seen and examined.  Clinically/ metabolically he is stable.  He will continue all his current medications as prescribed.  He denies any any refills at today's visit.  He will follow-up in 6 months with dr moreau with labs prior.  He is up-to-date on his eye exam.  He has been encouraged for diet and exercise for weight loss.  His cholesterol and blood pressure are stable.  Thyroid hormones are in satisfactory range.

## 2020-01-31 RX ORDER — AMLODIPINE BESYLATE 10 MG/1
TABLET ORAL
Qty: 90 TABLET | Refills: 4 | Status: SHIPPED | OUTPATIENT
Start: 2020-01-31 | End: 2020-03-19 | Stop reason: SDUPTHER

## 2020-01-31 RX ORDER — GLIPIZIDE 10 MG/1
TABLET, FILM COATED, EXTENDED RELEASE ORAL
Qty: 180 TABLET | Refills: 4 | Status: SHIPPED | OUTPATIENT
Start: 2020-01-31 | End: 2020-03-19 | Stop reason: SDUPTHER

## 2020-01-31 RX ORDER — LEVOTHYROXINE SODIUM 0.07 MG/1
TABLET ORAL
Qty: 90 TABLET | Refills: 4 | Status: SHIPPED | OUTPATIENT
Start: 2020-01-31 | End: 2020-03-19 | Stop reason: SDUPTHER

## 2020-01-31 RX ORDER — SITAGLIPTIN 100 MG/1
TABLET, FILM COATED ORAL
Qty: 90 TABLET | Refills: 4 | Status: SHIPPED | OUTPATIENT
Start: 2020-01-31 | End: 2020-03-19 | Stop reason: SDUPTHER

## 2020-01-31 RX ORDER — AZILSARTAN KAMEDOXOMIL 80 MG/1
TABLET ORAL
Qty: 90 TABLET | Refills: 4 | Status: SHIPPED | OUTPATIENT
Start: 2020-01-31 | End: 2020-03-19 | Stop reason: SDUPTHER

## 2020-01-31 RX ORDER — CARVEDILOL PHOSPHATE 80 MG/1
CAPSULE, EXTENDED RELEASE ORAL
Qty: 90 CAPSULE | Refills: 4 | Status: SHIPPED | OUTPATIENT
Start: 2020-01-31 | End: 2020-03-19 | Stop reason: SDUPTHER

## 2020-01-31 RX ORDER — PIOGLITAZONE HCL AND METFORMIN HCL 850; 15 MG/1; MG/1
TABLET ORAL
Qty: 270 TABLET | Refills: 4 | Status: SHIPPED | OUTPATIENT
Start: 2020-01-31 | End: 2020-03-19 | Stop reason: SDUPTHER

## 2020-01-31 RX ORDER — ATORVASTATIN CALCIUM 40 MG/1
TABLET, FILM COATED ORAL
Qty: 90 TABLET | Refills: 4 | Status: SHIPPED | OUTPATIENT
Start: 2020-01-31 | End: 2020-03-19 | Stop reason: SDUPTHER

## 2020-03-06 LAB
25(OH)D3+25(OH)D2 SERPL-MCNC: 35.7 NG/ML (ref 30–100)
ALBUMIN SERPL-MCNC: 3.7 G/DL (ref 3.5–5.2)
ALBUMIN/GLOB SERPL: 1.9 G/DL
ALP SERPL-CCNC: 52 U/L (ref 39–117)
ALT SERPL-CCNC: 16 U/L (ref 1–41)
AST SERPL-CCNC: 12 U/L (ref 1–40)
BILIRUB SERPL-MCNC: 0.5 MG/DL (ref 0.2–1.2)
BUN SERPL-MCNC: 11 MG/DL (ref 8–23)
BUN/CREAT SERPL: 9.9 (ref 7–25)
C PEPTIDE SERPL-MCNC: 2.6 NG/ML (ref 1.1–4.4)
CALCIUM SERPL-MCNC: 8.7 MG/DL (ref 8.6–10.5)
CHLORIDE SERPL-SCNC: 107 MMOL/L (ref 98–107)
CHOLEST SERPL-MCNC: 151 MG/DL (ref 0–200)
CO2 SERPL-SCNC: 25.5 MMOL/L (ref 22–29)
CREAT SERPL-MCNC: 1.11 MG/DL (ref 0.76–1.27)
GLOBULIN SER CALC-MCNC: 2 GM/DL
GLUCOSE SERPL-MCNC: 101 MG/DL (ref 65–99)
HBA1C MFR BLD: 6.6 % (ref 4.8–5.6)
HDLC SERPL-MCNC: 51 MG/DL (ref 40–60)
INTERPRETATION: NORMAL
LDLC SERPL CALC-MCNC: 93 MG/DL (ref 0–100)
Lab: NORMAL
POTASSIUM SERPL-SCNC: 3.9 MMOL/L (ref 3.5–5.2)
PROT SERPL-MCNC: 5.7 G/DL (ref 6–8.5)
SODIUM SERPL-SCNC: 145 MMOL/L (ref 136–145)
T3FREE SERPL-MCNC: 2.6 PG/ML (ref 2–4.4)
T4 FREE SERPL-MCNC: 1.25 NG/DL (ref 0.93–1.7)
T4 SERPL-MCNC: 7.43 MCG/DL (ref 4.5–11.7)
TRIGL SERPL-MCNC: 36 MG/DL (ref 0–150)
TSH SERPL DL<=0.005 MIU/L-ACNC: 1.82 UIU/ML (ref 0.27–4.2)
UNABLE TO VOID: NORMAL
URATE SERPL-MCNC: 4.7 MG/DL (ref 3.4–7)
VLDLC SERPL CALC-MCNC: 7.2 MG/DL

## 2020-03-19 ENCOUNTER — OFFICE VISIT (OUTPATIENT)
Dept: ENDOCRINOLOGY | Age: 70
End: 2020-03-19

## 2020-03-19 VITALS
HEIGHT: 73 IN | BODY MASS INDEX: 31.41 KG/M2 | RESPIRATION RATE: 16 BRPM | DIASTOLIC BLOOD PRESSURE: 78 MMHG | SYSTOLIC BLOOD PRESSURE: 118 MMHG | WEIGHT: 237 LBS

## 2020-03-19 DIAGNOSIS — E03.8 HYPOTHYROIDISM DUE TO HASHIMOTO'S THYROIDITIS: ICD-10-CM

## 2020-03-19 DIAGNOSIS — E55.9 VITAMIN D DEFICIENCY: ICD-10-CM

## 2020-03-19 DIAGNOSIS — N52.9 IMPOTENCE OF ORGANIC ORIGIN: ICD-10-CM

## 2020-03-19 DIAGNOSIS — I10 ESSENTIAL HYPERTENSION: ICD-10-CM

## 2020-03-19 DIAGNOSIS — E11.8 CONTROLLED TYPE 2 DIABETES MELLITUS WITH COMPLICATION, WITHOUT LONG-TERM CURRENT USE OF INSULIN (HCC): Primary | ICD-10-CM

## 2020-03-19 DIAGNOSIS — E06.3 HYPOTHYROIDISM DUE TO HASHIMOTO'S THYROIDITIS: ICD-10-CM

## 2020-03-19 DIAGNOSIS — E78.2 MIXED HYPERLIPIDEMIA: ICD-10-CM

## 2020-03-19 PROCEDURE — 99214 OFFICE O/P EST MOD 30 MIN: CPT | Performed by: INTERNAL MEDICINE

## 2020-03-19 RX ORDER — ATORVASTATIN CALCIUM 40 MG/1
40 TABLET, FILM COATED ORAL DAILY
Qty: 90 TABLET | Refills: 3 | Status: SHIPPED | OUTPATIENT
Start: 2020-03-19 | End: 2021-05-29

## 2020-03-19 RX ORDER — GLIPIZIDE 10 MG/1
TABLET, FILM COATED, EXTENDED RELEASE ORAL
Qty: 180 TABLET | Refills: 4 | Status: SHIPPED | OUTPATIENT
Start: 2020-03-19 | End: 2021-05-29

## 2020-03-19 RX ORDER — AMLODIPINE BESYLATE 10 MG/1
10 TABLET ORAL DAILY
Qty: 90 TABLET | Refills: 3 | Status: SHIPPED | OUTPATIENT
Start: 2020-03-19 | End: 2021-05-29

## 2020-03-19 RX ORDER — AZILSARTAN KAMEDOXOMIL 80 MG/1
80 TABLET ORAL DAILY
Qty: 90 TABLET | Refills: 3 | Status: SHIPPED | OUTPATIENT
Start: 2020-03-19 | End: 2021-05-29

## 2020-03-19 RX ORDER — SITAGLIPTIN 100 MG/1
100 TABLET, FILM COATED ORAL DAILY
Qty: 90 TABLET | Refills: 3 | Status: SHIPPED | OUTPATIENT
Start: 2020-03-19 | End: 2021-05-29

## 2020-03-19 RX ORDER — CARVEDILOL PHOSPHATE 80 MG/1
80 CAPSULE, EXTENDED RELEASE ORAL DAILY
Qty: 90 CAPSULE | Refills: 3 | Status: SHIPPED | OUTPATIENT
Start: 2020-03-19 | End: 2021-05-29

## 2020-03-19 RX ORDER — PIOGLITAZONE HCL AND METFORMIN HCL 850; 15 MG/1; MG/1
TABLET ORAL
Qty: 270 TABLET | Refills: 3 | Status: SHIPPED | OUTPATIENT
Start: 2020-03-19 | End: 2021-05-29

## 2020-03-19 RX ORDER — ZINC SULFATE 50(220)MG
220 CAPSULE ORAL DAILY
COMMUNITY
Start: 2020-02-19 | End: 2020-05-19

## 2020-03-19 RX ORDER — LEVOTHYROXINE SODIUM 0.07 MG/1
75 TABLET ORAL DAILY
Qty: 90 TABLET | Refills: 3 | Status: SHIPPED | OUTPATIENT
Start: 2020-03-19 | End: 2021-05-29

## 2020-03-19 NOTE — PROGRESS NOTES
"Subjective   Wilver Segura Sr. is a 69 y.o. male seen for follow up for DM2, hypothyroidism, vit d deficiency, lab review. Patient is checking BG 1-3 times a day. No BG readings. He denies any problems or concerns.     History of Present Illness this is a 69-year-old gentleman known patient with type 2 diabetes hypertension and dyslipidemia as well as vitamin D deficiency and hypothyroidism.  Over the course of last 6 months he has had no significant health problem for which to go to the ER or hospital.  /78   Resp 16   Ht 185.4 cm (73\")   Wt 108 kg (237 lb)   BMI 31.27 kg/m²   No Known Allergies    Current Outpatient Medications:   •  ACCU-CHEK GUIDE test strip, 3 times a day, Disp: 300 each, Rfl: 0  •  amLODIPine (NORVASC) 10 MG tablet, TAKE 1 TABLET DAILY, Disp: 90 tablet, Rfl: 4  •  aspirin 81 MG EC tablet, Take 81 mg by mouth Daily., Disp: , Rfl:   •  atorvastatin (LIPITOR) 40 MG tablet, TAKE 1 TABLET DAILY, Disp: 90 tablet, Rfl: 4  •  Blood Glucose Monitoring Suppl (ACCU-CHEK GUIDE) w/Device kit, 1 kit Daily., Disp: 1 kit, Rfl: 1  •  CALCIUM PO, Take by mouth., Disp: , Rfl:   •  carvedilol CR (COREG CR) 80 MG 24 hr capsule, TAKE 1 CAPSULE DAILY, Disp: 90 capsule, Rfl: 4  •  Cholecalciferol (VITAMIN D) 1000 UNITS tablet, Take 1,000 Units by mouth Daily., Disp: , Rfl:   •  Cyanocobalamin (VITAMIN B-12 PO), Take by mouth., Disp: , Rfl:   •  EDARBI 80 MG tablet tablet, TAKE 1 TABLET DAILY, Disp: 90 tablet, Rfl: 4  •  famotidine (PEPCID) 20 MG tablet, Take 1 tablet by mouth Daily., Disp: , Rfl:   •  Ferrous Sulfate (IRON) 325 (65 FE) MG tablet, Take by mouth., Disp: , Rfl:   •  glipizide (GLUCOTROL XL) 10 MG 24 hr tablet, TAKE 1 TABLET TWICE A DAY, Disp: 180 tablet, Rfl: 4  •  JANUVIA 100 MG tablet, TAKE 1 TABLET DAILY, Disp: 90 tablet, Rfl: 4  •  Lancets (ACCU-CHEK MULTICLIX) lancets, 3 times a day, Disp: 300 each, Rfl: 0  •  levothyroxine (SYNTHROID, LEVOTHROID) 75 MCG tablet, TAKE 1 TABLET DAILY, " Disp: 90 tablet, Rfl: 4  •  pioglitazone-metFORMIN (ACTOPLUS MET)  MG per tablet, TAKE 1 TABLET IN THE MORNING AND 2 TABLETS IN THE EVENING, Disp: 270 tablet, Rfl: 4  •  zinc sulfate (ZINCATE) 220 (50 Zn) MG capsule, Take 220 mg by mouth Daily., Disp: , Rfl:       The following portions of the patient's history were reviewed and updated as appropriate: allergies, current medications, past family history, past medical history, past social history, past surgical history and problem list.    Review of Systems   Constitutional: Negative.    HENT: Negative.    Eyes: Negative.    Respiratory: Negative.    Cardiovascular: Negative.    Gastrointestinal: Negative.    Endocrine: Negative.    Genitourinary: Negative.    Musculoskeletal: Negative.    Skin: Negative.    Allergic/Immunologic: Negative.    Neurological: Negative.    Hematological: Negative.    Psychiatric/Behavioral: Negative.    The above review of systems were reviewed, corroborated and accepted.    Objective   Physical Exam   Constitutional: He is oriented to person, place, and time. He appears well-developed and well-nourished. No distress.   HENT:   Head: Normocephalic and atraumatic.   Right Ear: External ear normal.   Left Ear: External ear normal.   Nose: Nose normal.   Mouth/Throat: Oropharynx is clear and moist. No oropharyngeal exudate.   Eyes: Pupils are equal, round, and reactive to light. Conjunctivae and EOM are normal. Right eye exhibits no discharge. Left eye exhibits no discharge. No scleral icterus.   Neck: Normal range of motion. Neck supple. No JVD present. No tracheal deviation present. No thyromegaly present.   Cardiovascular: Normal rate, regular rhythm, normal heart sounds and intact distal pulses. Exam reveals no gallop and no friction rub.   No murmur heard.  Pulmonary/Chest: Effort normal and breath sounds normal. No stridor. No respiratory distress. He has no wheezes. He has no rales. He exhibits no tenderness.   Abdominal: Soft.  Bowel sounds are normal. He exhibits no distension and no mass. There is no tenderness. There is no rebound and no guarding. No hernia.   Musculoskeletal: Normal range of motion. He exhibits no edema, tenderness or deformity.   Lymphadenopathy:     He has no cervical adenopathy.   Neurological: He is alert and oriented to person, place, and time. He has normal reflexes. He displays normal reflexes. No cranial nerve deficit or sensory deficit. He exhibits normal muscle tone. Coordination normal.   Skin: Skin is warm. No rash noted. He is not diaphoretic. No erythema. No pallor.   Psychiatric: He has a normal mood and affect. His behavior is normal. Judgment and thought content normal.   Nursing note and vitals reviewed.  No significant change since 2/10/2019 office visit.  Results for orders placed or performed in visit on 07/25/19   T4 & TSH ([facility])   Result Value Ref Range    TSH 1.820 0.270 - 4.200 uIU/mL    T4, Total 7.43 4.50 - 11.70 mcg/dL   T3, Free   Result Value Ref Range    T3, Free 2.6 2.0 - 4.4 pg/mL   T4, Free   Result Value Ref Range    Free T4 1.25 0.93 - 1.70 ng/dL   Uric Acid   Result Value Ref Range    Uric Acid 4.7 3.4 - 7.0 mg/dL   Vitamin D 25 Hydroxy   Result Value Ref Range    25 Hydroxy, Vitamin D 35.7 30.0 - 100.0 ng/ml   Comprehensive Metabolic Panel   Result Value Ref Range    Glucose 101 (H) 65 - 99 mg/dL    BUN 11 8 - 23 mg/dL    Creatinine 1.11 0.76 - 1.27 mg/dL    eGFR Non African Am 66 >60 mL/min/1.73    eGFR African Am 80 >60 mL/min/1.73    BUN/Creatinine Ratio 9.9 7.0 - 25.0    Sodium 145 136 - 145 mmol/L    Potassium 3.9 3.5 - 5.2 mmol/L    Chloride 107 98 - 107 mmol/L    Total CO2 25.5 22.0 - 29.0 mmol/L    Calcium 8.7 8.6 - 10.5 mg/dL    Total Protein 5.7 (L) 6.0 - 8.5 g/dL    Albumin 3.70 3.50 - 5.20 g/dL    Globulin 2.0 gm/dL    A/G Ratio 1.9 g/dL    Total Bilirubin 0.5 0.2 - 1.2 mg/dL    Alkaline Phosphatase 52 39 - 117 U/L    AST (SGOT) 12 1 - 40 U/L    ALT (SGPT) 16 1 - 41  U/L   C-Peptide   Result Value Ref Range    C-Peptide 2.6 1.1 - 4.4 ng/mL   Hemoglobin A1c   Result Value Ref Range    Hemoglobin A1C 6.60 (H) 4.80 - 5.60 %   Lipid Panel   Result Value Ref Range    Total Cholesterol 151 0 - 200 mg/dL    Triglycerides 36 0 - 150 mg/dL    HDL Cholesterol 51 40 - 60 mg/dL    VLDL Cholesterol 7.2 mg/dL    LDL Cholesterol  93 0 - 100 mg/dL   Unable To Void   Result Value Ref Range    Unable to Void Comment    Cardiovascular Risk Assessment   Result Value Ref Range    Interpretation Note    Diabetes Patient Education   Result Value Ref Range    PDF Image Not applicable          Assessment/Plan   Wilver was seen today for diabetes.    Diagnoses and all orders for this visit:    Controlled type 2 diabetes mellitus with complication, without long-term current use of insulin (CMS/[facility])  -     T4 & TSH ([facility]); Future  -     T3, Free; Future  -     T4, Free; Future  -     Uric Acid; Future  -     Vitamin D 25 Hydroxy; Future  -     Comprehensive Metabolic Panel; Future  -     C-Peptide; Future  -     Hemoglobin A1c; Future  -     Lipid Panel; Future  -     MicroAlbumin, Urine, Random - Urine, Clean Catch; Future    Impotence of organic origin  -     T4 & TSH ([facility]); Future  -     T3, Free; Future  -     T4, Free; Future  -     Uric Acid; Future  -     Vitamin D 25 Hydroxy; Future  -     Comprehensive Metabolic Panel; Future  -     C-Peptide; Future  -     Hemoglobin A1c; Future  -     Lipid Panel; Future  -     MicroAlbumin, Urine, Random - Urine, Clean Catch; Future    Mixed hyperlipidemia  -     T4 & TSH ([facility]); Future  -     T3, Free; Future  -     T4, Free; Future  -     Uric Acid; Future  -     Vitamin D 25 Hydroxy; Future  -     Comprehensive Metabolic Panel; Future  -     C-Peptide; Future  -     Hemoglobin A1c; Future  -     Lipid Panel; Future  -     MicroAlbumin, Urine, Random - Urine, Clean Catch; Future    Essential hypertension  -     T4 & TSH ([facility]); Future  -      T3, Free; Future  -     T4, Free; Future  -     Uric Acid; Future  -     Vitamin D 25 Hydroxy; Future  -     Comprehensive Metabolic Panel; Future  -     C-Peptide; Future  -     Hemoglobin A1c; Future  -     Lipid Panel; Future  -     MicroAlbumin, Urine, Random - Urine, Clean Catch; Future    Vitamin D deficiency  -     T4 & TSH ([facility]); Future  -     T3, Free; Future  -     T4, Free; Future  -     Uric Acid; Future  -     Vitamin D 25 Hydroxy; Future  -     Comprehensive Metabolic Panel; Future  -     C-Peptide; Future  -     Hemoglobin A1c; Future  -     Lipid Panel; Future  -     MicroAlbumin, Urine, Random - Urine, Clean Catch; Future    Hypothyroidism due to Hashimoto's thyroiditis  -     T4 & TSH ([facility]); Future  -     T3, Free; Future  -     T4, Free; Future  -     Uric Acid; Future  -     Vitamin D 25 Hydroxy; Future  -     Comprehensive Metabolic Panel; Future  -     C-Peptide; Future  -     Hemoglobin A1c; Future  -     Lipid Panel; Future  -     MicroAlbumin, Urine, Random - Urine, Clean Catch; Future    Other orders  -     amLODIPine (NORVASC) 10 MG tablet; Take 1 tablet by mouth Daily.  -     atorvastatin (LIPITOR) 40 MG tablet; Take 1 tablet by mouth Daily.  -     carvedilol CR (COREG CR) 80 MG 24 hr capsule; Take 1 capsule by mouth Daily.  -     EDARBI 80 MG tablet tablet; Take 1 tablet by mouth Daily.  -     glipizide (GLUCOTROL XL) 10 MG 24 hr tablet; TAKE 1 TABLET TWICE A DAY  -     JANUVIA 100 MG tablet; Take 1 tablet by mouth Daily.  -     levothyroxine (SYNTHROID, LEVOTHROID) 75 MCG tablet; Take 1 tablet by mouth Daily.  -     pioglitazone-metFORMIN (ACTOPLUS MET)  MG per tablet; TAKE 1 TABLET IN THE MORNING AND 2 TABLETS IN THE EVENING      In summary I saw and examined this 69-year-old gentleman for above-mentioned problems.  I reviewed his laboratory evaluation of 3/5/2020 and provided him with a hard copy of it.  Overall he is clinically and metabolically stable and therefore we  will go ahead and continue all his current prescriptions.  He will see Ms. Brie Fraser in 6 months or sooner if needed but laboratory evaluation prior to each office visit.

## 2020-09-04 ENCOUNTER — RESULTS ENCOUNTER (OUTPATIENT)
Dept: ENDOCRINOLOGY | Age: 70
End: 2020-09-04

## 2020-09-04 DIAGNOSIS — E03.8 HYPOTHYROIDISM DUE TO HASHIMOTO'S THYROIDITIS: ICD-10-CM

## 2020-09-04 DIAGNOSIS — E11.8 CONTROLLED TYPE 2 DIABETES MELLITUS WITH COMPLICATION, WITHOUT LONG-TERM CURRENT USE OF INSULIN (HCC): ICD-10-CM

## 2020-09-04 DIAGNOSIS — E06.3 HYPOTHYROIDISM DUE TO HASHIMOTO'S THYROIDITIS: ICD-10-CM

## 2020-09-04 DIAGNOSIS — I10 ESSENTIAL HYPERTENSION: ICD-10-CM

## 2020-09-04 DIAGNOSIS — E55.9 VITAMIN D DEFICIENCY: ICD-10-CM

## 2020-09-04 DIAGNOSIS — N52.9 IMPOTENCE OF ORGANIC ORIGIN: ICD-10-CM

## 2020-09-04 DIAGNOSIS — E78.2 MIXED HYPERLIPIDEMIA: ICD-10-CM

## 2020-09-09 ENCOUNTER — LAB (OUTPATIENT)
Dept: ENDOCRINOLOGY | Age: 70
End: 2020-09-09

## 2020-09-09 DIAGNOSIS — F52.21 ERECTILE DYSFUNCTION OF NONORGANIC ORIGIN: ICD-10-CM

## 2020-09-09 DIAGNOSIS — E06.3 HYPOTHYROIDISM DUE TO HASHIMOTO'S THYROIDITIS: ICD-10-CM

## 2020-09-09 DIAGNOSIS — E11.65 UNCONTROLLED TYPE 2 DIABETES MELLITUS WITH HYPERGLYCEMIA (HCC): ICD-10-CM

## 2020-09-09 DIAGNOSIS — E11.8 CONTROLLED TYPE 2 DIABETES MELLITUS WITH COMPLICATION, WITHOUT LONG-TERM CURRENT USE OF INSULIN (HCC): ICD-10-CM

## 2020-09-09 DIAGNOSIS — E11.9 TYPE 2 DIABETES MELLITUS WITHOUT COMPLICATION, UNSPECIFIED WHETHER LONG TERM INSULIN USE (HCC): ICD-10-CM

## 2020-09-09 DIAGNOSIS — E78.5 DYSLIPIDEMIA: ICD-10-CM

## 2020-09-09 DIAGNOSIS — E55.9 VITAMIN D DEFICIENCY: ICD-10-CM

## 2020-09-09 DIAGNOSIS — I10 ESSENTIAL HYPERTENSION: ICD-10-CM

## 2020-09-09 DIAGNOSIS — E78.2 MIXED HYPERLIPIDEMIA: ICD-10-CM

## 2020-09-09 DIAGNOSIS — E03.8 HYPOTHYROIDISM DUE TO HASHIMOTO'S THYROIDITIS: ICD-10-CM

## 2020-09-09 DIAGNOSIS — E78.2 MIXED HYPERLIPIDEMIA: Primary | ICD-10-CM

## 2020-09-09 DIAGNOSIS — R97.20 ELEVATED PSA: ICD-10-CM

## 2020-09-17 LAB
25(OH)D3+25(OH)D2 SERPL-MCNC: 35.5 NG/ML (ref 30–100)
ALBUMIN SERPL-MCNC: 4 G/DL (ref 3.5–5.2)
ALBUMIN/GLOB SERPL: 2.7 G/DL
ALP SERPL-CCNC: 64 U/L (ref 39–117)
ALT SERPL-CCNC: 14 U/L (ref 1–41)
AST SERPL-CCNC: 10 U/L (ref 1–40)
BILIRUB SERPL-MCNC: 0.5 MG/DL (ref 0–1.2)
BUN SERPL-MCNC: 12 MG/DL (ref 8–23)
BUN/CREAT SERPL: 9.9 (ref 7–25)
C PEPTIDE SERPL-MCNC: 2.9 NG/ML (ref 1.1–4.4)
CALCIUM SERPL-MCNC: 8.6 MG/DL (ref 8.6–10.5)
CHLORIDE SERPL-SCNC: 106 MMOL/L (ref 98–107)
CHOLEST SERPL-MCNC: 137 MG/DL (ref 0–200)
CO2 SERPL-SCNC: 28.3 MMOL/L (ref 22–29)
CREAT SERPL-MCNC: 1.21 MG/DL (ref 0.76–1.27)
GLOBULIN SER CALC-MCNC: 1.5 GM/DL
GLUCOSE SERPL-MCNC: 78 MG/DL (ref 65–99)
HBA1C MFR BLD: 6.5 % (ref 4.8–5.6)
HCT VFR BLD AUTO: 41.5 % (ref 37.5–51)
HDLC SERPL-MCNC: 47 MG/DL (ref 40–60)
HGB BLD-MCNC: 13.2 G/DL (ref 13–17.7)
INTERPRETATION: NORMAL
LDLC SERPL CALC-MCNC: 81 MG/DL (ref 0–100)
Lab: NORMAL
MICROALBUMIN UR-MCNC: 4.8 UG/ML
POTASSIUM SERPL-SCNC: 4 MMOL/L (ref 3.5–5.2)
PROT SERPL-MCNC: 5.5 G/DL (ref 6–8.5)
PSA SERPL-MCNC: 3.75 NG/ML (ref 0–4)
SHBG SERPL-SCNC: 35.7 NMOL/L (ref 19.3–76.4)
SODIUM SERPL-SCNC: 142 MMOL/L (ref 136–145)
T3FREE SERPL-MCNC: 2.9 PG/ML (ref 2–4.4)
T4 FREE SERPL-MCNC: 1.22 NG/DL (ref 0.93–1.7)
T4 SERPL-MCNC: 6.23 MCG/DL (ref 4.5–11.7)
TESTOST FREE SERPL-MCNC: 7 PG/ML (ref 6.6–18.1)
TESTOST SERPL-MCNC: 398 NG/DL (ref 264–916)
THYROGLOB AB SERPL-ACNC: 8 IU/ML
THYROGLOB SERPL-MCNC: 4.8 NG/ML
THYROGLOB SERPL-MCNC: ABNORMAL NG/ML
TRIGL SERPL-MCNC: 43 MG/DL (ref 0–150)
TSH SERPL DL<=0.005 MIU/L-ACNC: 2.95 UIU/ML (ref 0.27–4.2)
VLDLC SERPL CALC-MCNC: 8.6 MG/DL

## 2020-09-23 ENCOUNTER — OFFICE VISIT (OUTPATIENT)
Dept: ENDOCRINOLOGY | Age: 70
End: 2020-09-23

## 2020-09-23 VITALS
WEIGHT: 243.4 LBS | HEIGHT: 72 IN | SYSTOLIC BLOOD PRESSURE: 126 MMHG | DIASTOLIC BLOOD PRESSURE: 64 MMHG | BODY MASS INDEX: 32.97 KG/M2

## 2020-09-23 DIAGNOSIS — E03.8 HYPOTHYROIDISM DUE TO HASHIMOTO'S THYROIDITIS: ICD-10-CM

## 2020-09-23 DIAGNOSIS — E55.9 VITAMIN D DEFICIENCY: ICD-10-CM

## 2020-09-23 DIAGNOSIS — I10 ESSENTIAL HYPERTENSION: ICD-10-CM

## 2020-09-23 DIAGNOSIS — E06.3 HYPOTHYROIDISM DUE TO HASHIMOTO'S THYROIDITIS: ICD-10-CM

## 2020-09-23 DIAGNOSIS — E78.5 DYSLIPIDEMIA: ICD-10-CM

## 2020-09-23 DIAGNOSIS — E11.9 TYPE 2 DIABETES MELLITUS WITHOUT COMPLICATION, UNSPECIFIED WHETHER LONG TERM INSULIN USE (HCC): Primary | ICD-10-CM

## 2020-09-23 PROCEDURE — 99214 OFFICE O/P EST MOD 30 MIN: CPT | Performed by: NURSE PRACTITIONER

## 2020-09-23 RX ORDER — ZINC SULFATE 50(220)MG
CAPSULE ORAL
COMMUNITY
Start: 2020-08-12

## 2020-09-23 RX ORDER — INFLUENZA A VIRUS A/MICHIGAN/45/2015 X-275 (H1N1) ANTIGEN (FORMALDEHYDE INACTIVATED), INFLUENZA A VIRUS A/SINGAPORE/INFIMH-16-0019/2016 IVR-186 (H3N2) ANTIGEN (FORMALDEHYDE INACTIVATED), INFLUENZA B VIRUS B/PHUKET/3073/2013 ANTIGEN (FORMALDEHYDE INACTIVATED), AND INFLUENZA B VIRUS B/MARYLAND/15/2016 BX-69A ANTIGEN (FORMALDEHYDE INACTIVATED) 60; 60; 60; 60 UG/.7ML; UG/.7ML; UG/.7ML; UG/.7ML
INJECTION, SUSPENSION INTRAMUSCULAR
COMMUNITY
Start: 2020-08-16

## 2020-09-23 NOTE — PROGRESS NOTES
"Charles Segura Sr. is a 69 y.o. male is here today for follow-up.  Chief Complaint   Patient presents with   • Diabetes     F/U, recent labs, type 2 diabetes, does not check BS, eye exam last month     /64   Ht 182.9 cm (72\")   Wt 110 kg (243 lb 6.4 oz)   BMI 33.01 kg/m²   Current Outpatient Medications on File Prior to Visit   Medication Sig   • ACCU-CHEK GUIDE test strip 3 times a day   • amLODIPine (NORVASC) 10 MG tablet Take 1 tablet by mouth Daily.   • aspirin 81 MG EC tablet Take 81 mg by mouth Daily.   • atorvastatin (LIPITOR) 40 MG tablet Take 1 tablet by mouth Daily.   • Blood Glucose Monitoring Suppl (ACCU-CHEK GUIDE) w/Device kit 1 kit Daily.   • CALCIUM PO Take by mouth.   • carvedilol CR (COREG CR) 80 MG 24 hr capsule Take 1 capsule by mouth Daily.   • Cholecalciferol (VITAMIN D) 1000 UNITS tablet Take 1,000 Units by mouth Daily.   • Cyanocobalamin (VITAMIN B-12 PO) Take by mouth.   • EDARBI 80 MG tablet tablet Take 1 tablet by mouth Daily.   • famotidine (PEPCID) 20 MG tablet Take 1 tablet by mouth Daily.   • Ferrous Sulfate (IRON) 325 (65 FE) MG tablet Take by mouth.   • glipizide (GLUCOTROL XL) 10 MG 24 hr tablet TAKE 1 TABLET TWICE A DAY   • JANUVIA 100 MG tablet Take 1 tablet by mouth Daily.   • Lancets (ACCU-CHEK MULTICLIX) lancets 3 times a day   • levothyroxine (SYNTHROID, LEVOTHROID) 75 MCG tablet Take 1 tablet by mouth Daily.   • pioglitazone-metFORMIN (ACTOPLUS MET)  MG per tablet TAKE 1 TABLET IN THE MORNING AND 2 TABLETS IN THE EVENING   • Fluzone High-Dose Quadrivalent 0.7 ML suspension prefilled syringe TO BE ADMINISTERED BY PHARMACIST FOR IMMUNIZATION   • zinc sulfate (ZINCATE) 220 (50 Zn) MG capsule TK 1 C PO QD     No current facility-administered medications on file prior to visit.      Family History   Problem Relation Age of Onset   • Heart failure Mother    • Diabetes Mother    • Heart disease Mother    • Hypertension Mother    • Thyroid disease Mother "    • Diabetes Maternal Grandmother    • Hypertension Maternal Grandmother    • Diabetes Paternal Grandmother      Social History     Tobacco Use   • Smoking status: Never Smoker   • Smokeless tobacco: Never Used   Substance Use Topics   • Alcohol use: No     Comment: stopped   • Drug use: Not on file     No Known Allergies      History of Present Illness   Encounter Diagnoses   Name Primary?   • Dyslipidemia    • Essential hypertension    • Type 2 diabetes mellitus without complication, unspecified whether long term insulin use (CMS/[facility]) Yes   • Hypothyroidism due to Hashimoto's thyroiditis    • Vitamin D deficiency      69-year-old male patient here today for follow-up visit.  He has been seen for the above-mentioned problems.  His medication list was reviewed and updated for accuracy.  He is taking all his medications as prescribed.  He is concerned about weight gain.  Currently he is only walking about 1-1/2 miles per day.  We discussed diet and exercise to improve his weight and we also discussed the benefit of a GLP-1 in place of Januvia to also help with the defect of diabetes that causes weight gain and makes it difficult to lose weight.  He will think about it and discuss at next visit.  His labs were reviewed he was provided a copy.  His hemoglobin A1c reflects good glycemic control.  He is taking his thyroid hormone daily consistently and denies any symptoms associated with hyper or hypothyroidism.  He denies neuropathy.  He is up-to-date on his eye exam    The following portions of the patient's history were reviewed and updated as appropriate: allergies, current medications, past family history, past medical history, past social history, past surgical history and problem list.    Review of Systems   Constitutional: Negative for appetite change, fatigue and unexpected weight change.   Cardiovascular: Negative for chest pain, palpitations and leg swelling.   Gastrointestinal: Negative for abdominal pain,  constipation and diarrhea.   Endocrine: Negative for cold intolerance, heat intolerance, polydipsia, polyphagia and polyuria.   Neurological: Negative for dizziness, light-headedness, numbness and headaches.   Psychiatric/Behavioral: Negative for sleep disturbance.       Objective   Physical Exam  Vitals signs and nursing note reviewed.   Constitutional:       General: He is not in acute distress.     Appearance: He is well-developed. He is not diaphoretic.   HENT:      Head: Normocephalic and atraumatic.      Right Ear: External ear normal.      Left Ear: External ear normal.      Nose: Nose normal.   Eyes:      General:         Right eye: No discharge.         Left eye: No discharge.      Pupils: Pupils are equal, round, and reactive to light.   Neck:      Musculoskeletal: Normal range of motion and neck supple. No edema or erythema.      Thyroid: No thyromegaly.      Vascular: No carotid bruit.      Trachea: No tracheal deviation.   Cardiovascular:      Rate and Rhythm: Normal rate and regular rhythm.      Heart sounds: Normal heart sounds. No murmur. No friction rub. No gallop.    Pulmonary:      Effort: Pulmonary effort is normal. No respiratory distress.      Breath sounds: Normal breath sounds. No wheezing or rales.   Abdominal:      General: Bowel sounds are normal. There is no distension.      Palpations: Abdomen is soft.      Tenderness: There is no abdominal tenderness.   Musculoskeletal: Normal range of motion.         General: No deformity.   Feet:      Comments: Normal foot exam no swelling no skin issues no toenail fungus no neuropathy  Lymphadenopathy:      Cervical: No cervical adenopathy.   Skin:     General: Skin is warm and dry.      Coloration: Skin is not pale.      Findings: No erythema or rash.   Neurological:      Mental Status: He is alert and oriented to person, place, and time.      Coordination: Coordination normal.   Psychiatric:         Behavior: Behavior normal.         Thought  Content: Thought content normal.         Judgment: Judgment normal.       Results for orders placed or performed in visit on 09/09/20   MicroAlbumin, Urine, Random - Urine, Clean Catch    Specimen: Urine, Clean Catch   Result Value Ref Range    Microalbumin, Urine 4.8 Not Estab. ug/mL   Comprehensive Thyroglobulin    Specimen: Blood   Result Value Ref Range    Thyroglobulin Ab 8.0 (H) IU/mL    Thyroglobulin Comment ng/mL    Thyroglobulin ([facility]) 4.8 ng/mL   T3, Free    Specimen: Blood   Result Value Ref Range    T3, Free 2.9 2.0 - 4.4 pg/mL   T4 & TSH ([facility])    Specimen: Blood   Result Value Ref Range    TSH 2.950 0.270 - 4.200 uIU/mL    T4, Total 6.23 4.50 - 11.70 mcg/dL   T4, Free    Specimen: Blood   Result Value Ref Range    Free T4 1.22 0.93 - 1.70 ng/dL   Vitamin D 25 Hydroxy    Specimen: Blood   Result Value Ref Range    25 Hydroxy, Vitamin D 35.5 30.0 - 100.0 ng/ml   Hemoglobin A1c    Specimen: Blood   Result Value Ref Range    Hemoglobin A1C 6.50 (H) 4.80 - 5.60 %   C-Peptide    Specimen: Blood   Result Value Ref Range    C-Peptide 2.9 1.1 - 4.4 ng/mL   Comprehensive Metabolic Panel    Specimen: Blood   Result Value Ref Range    Glucose 78 65 - 99 mg/dL    BUN 12 8 - 23 mg/dL    Creatinine 1.21 0.76 - 1.27 mg/dL    eGFR Non African Am 59 (L) >60 mL/min/1.73    eGFR African Am 72 >60 mL/min/1.73    BUN/Creatinine Ratio 9.9 7.0 - 25.0    Sodium 142 136 - 145 mmol/L    Potassium 4.0 3.5 - 5.2 mmol/L    Chloride 106 98 - 107 mmol/L    Total CO2 28.3 22.0 - 29.0 mmol/L    Calcium 8.6 8.6 - 10.5 mg/dL    Total Protein 5.5 (L) 6.0 - 8.5 g/dL    Albumin 4.00 3.50 - 5.20 g/dL    Globulin 1.5 gm/dL    A/G Ratio 2.7 g/dL    Total Bilirubin 0.5 0.0 - 1.2 mg/dL    Alkaline Phosphatase 64 39 - 117 U/L    AST (SGOT) 10 1 - 40 U/L    ALT (SGPT) 14 1 - 41 U/L   Hemoglobin & Hematocrit, Blood    Specimen: Blood   Result Value Ref Range    Hemoglobin 13.2 13.0 - 17.7 g/dL    Hematocrit 41.5 37.5 - 51.0 %    TestT+TestF+SHBG    Specimen: Blood   Result Value Ref Range    Testosterone, Total 398 264 - 916 ng/dL    Testosterone, Free 7.0 6.6 - 18.1 pg/mL    Sex Hormone Binding Globulin 35.7 19.3 - 76.4 nmol/L   Lipid Panel    Specimen: Blood   Result Value Ref Range    Total Cholesterol 137 0 - 200 mg/dL    Triglycerides 43 0 - 150 mg/dL    HDL Cholesterol 47 40 - 60 mg/dL    VLDL Cholesterol Jack 8.6 mg/dL    LDL Chol Calc (NIH) 81 0 - 100 mg/dL   PSA DIAGNOSTIC    Specimen: Blood   Result Value Ref Range    PSA 3.750 0.000 - 4.000 ng/mL   Cardiovascular Risk Assessment   Result Value Ref Range    Interpretation Note    Diabetes Patient Education   Result Value Ref Range    PDF Image Not applicable            Assessment/Plan   Problems Addressed this Visit        Cardiovascular and Mediastinum    Hypertension       Digestive    Vitamin D deficiency       Endocrine    Hypothyroidism    Type 2 diabetes mellitus (CMS/HCC) - Primary       Other    Dyslipidemia        Patient was seen and examined.  Metabolically and clinically he presents stable.  He will continue on his current medications as prescribed.  We discussed the benefit of a GLP-1 in place of Januvia however he does not want a change at this time.  He denies any hypoglycemic events.  His thyroid hormones are in satisfactory range.  He is walking for exercise and has been encouraged to increase his distance.  His blood pressures in satisfactory range.  He is up-to-date on his eye exam.  He has no neuropathy.  He will follow-up in 6 months with labs prior.  Thyroid hormones and cholesterol are in satisfactory range.  Vitamin D is stable.

## 2021-03-10 DIAGNOSIS — E06.3 HYPOTHYROIDISM DUE TO HASHIMOTO'S THYROIDITIS: ICD-10-CM

## 2021-03-10 DIAGNOSIS — E55.9 VITAMIN D DEFICIENCY: ICD-10-CM

## 2021-03-10 DIAGNOSIS — E11.65 UNCONTROLLED TYPE 2 DIABETES MELLITUS WITH HYPERGLYCEMIA (HCC): ICD-10-CM

## 2021-03-10 DIAGNOSIS — E03.8 HYPOTHYROIDISM DUE TO HASHIMOTO'S THYROIDITIS: ICD-10-CM

## 2021-03-10 DIAGNOSIS — I10 ESSENTIAL HYPERTENSION: ICD-10-CM

## 2021-03-10 DIAGNOSIS — R97.20 ELEVATED PSA: ICD-10-CM

## 2021-03-10 DIAGNOSIS — E78.2 MIXED HYPERLIPIDEMIA: Primary | ICD-10-CM

## 2021-03-11 ENCOUNTER — LAB (OUTPATIENT)
Dept: ENDOCRINOLOGY | Age: 71
End: 2021-03-11

## 2021-03-11 DIAGNOSIS — E78.2 MIXED HYPERLIPIDEMIA: ICD-10-CM

## 2021-03-11 DIAGNOSIS — E11.65 UNCONTROLLED TYPE 2 DIABETES MELLITUS WITH HYPERGLYCEMIA (HCC): ICD-10-CM

## 2021-03-11 DIAGNOSIS — E03.8 HYPOTHYROIDISM DUE TO HASHIMOTO'S THYROIDITIS: ICD-10-CM

## 2021-03-11 DIAGNOSIS — R97.20 ELEVATED PSA: ICD-10-CM

## 2021-03-11 DIAGNOSIS — I10 ESSENTIAL HYPERTENSION: ICD-10-CM

## 2021-03-11 DIAGNOSIS — E55.9 VITAMIN D DEFICIENCY: ICD-10-CM

## 2021-03-11 DIAGNOSIS — E06.3 HYPOTHYROIDISM DUE TO HASHIMOTO'S THYROIDITIS: ICD-10-CM

## 2021-03-13 LAB
25(OH)D3+25(OH)D2 SERPL-MCNC: 37.9 NG/ML (ref 30–100)
ALBUMIN SERPL-MCNC: 3.5 G/DL (ref 3.5–5.2)
ALBUMIN/GLOB SERPL: 1.8 G/DL
ALP SERPL-CCNC: 50 U/L (ref 39–117)
ALT SERPL-CCNC: 13 U/L (ref 1–41)
AST SERPL-CCNC: 19 U/L (ref 1–40)
BILIRUB SERPL-MCNC: 0.7 MG/DL (ref 0–1.2)
BUN SERPL-MCNC: 12 MG/DL (ref 8–23)
BUN/CREAT SERPL: 11.4 (ref 7–25)
C PEPTIDE SERPL-MCNC: 3 NG/ML (ref 1.1–4.4)
CALCIUM SERPL-MCNC: 8.8 MG/DL (ref 8.6–10.5)
CHLORIDE SERPL-SCNC: 105 MMOL/L (ref 98–107)
CHOLEST SERPL-MCNC: 140 MG/DL (ref 0–200)
CO2 SERPL-SCNC: 27.3 MMOL/L (ref 22–29)
CREAT SERPL-MCNC: 1.05 MG/DL (ref 0.76–1.27)
GLOBULIN SER CALC-MCNC: 2 GM/DL
GLUCOSE SERPL-MCNC: 72 MG/DL (ref 65–99)
HBA1C MFR BLD: 6.5 % (ref 4.8–5.6)
HDLC SERPL-MCNC: 45 MG/DL (ref 40–60)
IMP & REVIEW OF LAB RESULTS: NORMAL
LDLC SERPL CALC-MCNC: 86 MG/DL (ref 0–100)
Lab: NORMAL
POTASSIUM SERPL-SCNC: 4 MMOL/L (ref 3.5–5.2)
PROT SERPL-MCNC: 5.5 G/DL (ref 6–8.5)
PSA SERPL-MCNC: 4.36 NG/ML (ref 0–4)
SODIUM SERPL-SCNC: 140 MMOL/L (ref 136–145)
T3FREE SERPL-MCNC: 2.7 PG/ML (ref 2–4.4)
T4 FREE SERPL-MCNC: 1.26 NG/DL (ref 0.93–1.7)
TRIGL SERPL-MCNC: 35 MG/DL (ref 0–150)
TSH SERPL DL<=0.005 MIU/L-ACNC: 4.17 UIU/ML (ref 0.27–4.2)
UNABLE TO VOID: NORMAL
VLDLC SERPL CALC-MCNC: 9 MG/DL (ref 5–40)

## 2021-03-25 ENCOUNTER — OFFICE VISIT (OUTPATIENT)
Dept: ENDOCRINOLOGY | Age: 71
End: 2021-03-25

## 2021-03-25 VITALS
BODY MASS INDEX: 32.8 KG/M2 | HEIGHT: 72 IN | DIASTOLIC BLOOD PRESSURE: 62 MMHG | SYSTOLIC BLOOD PRESSURE: 126 MMHG | WEIGHT: 242.2 LBS

## 2021-03-25 DIAGNOSIS — E78.5 DYSLIPIDEMIA: ICD-10-CM

## 2021-03-25 DIAGNOSIS — E06.3 HYPOTHYROIDISM DUE TO HASHIMOTO'S THYROIDITIS: ICD-10-CM

## 2021-03-25 DIAGNOSIS — I10 ESSENTIAL HYPERTENSION: ICD-10-CM

## 2021-03-25 DIAGNOSIS — E55.9 VITAMIN D DEFICIENCY: ICD-10-CM

## 2021-03-25 DIAGNOSIS — E03.8 HYPOTHYROIDISM DUE TO HASHIMOTO'S THYROIDITIS: ICD-10-CM

## 2021-03-25 DIAGNOSIS — E11.69 CONTROLLED TYPE 2 DIABETES MELLITUS WITH OTHER SPECIFIED COMPLICATION, WITHOUT LONG-TERM CURRENT USE OF INSULIN (HCC): Primary | ICD-10-CM

## 2021-03-25 PROBLEM — E11.9 DIABETES MELLITUS TYPE II, CONTROLLED: Status: ACTIVE | Noted: 2021-03-25

## 2021-03-25 PROCEDURE — 99214 OFFICE O/P EST MOD 30 MIN: CPT | Performed by: NURSE PRACTITIONER

## 2021-03-25 NOTE — PROGRESS NOTES
"Charles Segura Sr. is a 70 y.o. male. he is here today for follow-up.  Chief Complaint   Patient presents with   • Diabetes     type 2 diabetes, recent labs, checks blood sugar once a week, eye exam 7/2020     /62   Ht 182.9 cm (72\")   Wt 110 kg (242 lb 3.2 oz)   BMI 32.85 kg/m²       History of Present Illness   Encounter Diagnoses   Name Primary?   • Hypothyroidism due to Hashimoto's thyroiditis    • Essential hypertension    • Vitamin D deficiency    • Dyslipidemia    • Controlled type 2 diabetes mellitus with other specified complication, without long-term current use of insulin (CMS/HCC) Yes     70-year-old male patient here today for follow-up visit.  He has been seen for the above-mentioned problems.  His medication list was reviewed and updated for accuracy.  He denies any hypoglycemic events.  He has not been very active during the pandemic.  He has no complaints at today's visit.  He denies any any refills.  He would be interested in following up with Dr. Mccabe at his new location.  He was given his contact information to reach out should he decide to schedule an appointment.    The following portions of the patient's history were reviewed and updated as appropriate:   Past Medical History:   Diagnosis Date   • Dyslipidemia    • ED (erectile dysfunction)    • Hypertension    • Hypothyroidism    • Type 2 diabetes mellitus (CMS/HCC)    • Vitamin D deficiency      Past Surgical History:   Procedure Laterality Date   • HAND SURGERY       Family History   Problem Relation Age of Onset   • Heart failure Mother    • Diabetes Mother    • Heart disease Mother    • Hypertension Mother    • Thyroid disease Mother    • Diabetes Maternal Grandmother    • Hypertension Maternal Grandmother    • Diabetes Paternal Grandmother        Current Outpatient Medications   Medication Sig Dispense Refill   • amLODIPine (NORVASC) 10 MG tablet Take 1 tablet by mouth Daily. 90 tablet 3   • aspirin 81 MG EC " tablet Take 81 mg by mouth Daily.     • atorvastatin (LIPITOR) 40 MG tablet Take 1 tablet by mouth Daily. 90 tablet 3   • Blood Glucose Monitoring Suppl (ACCU-CHEK GUIDE) w/Device kit 1 kit Daily. 1 kit 1   • CALCIUM PO Take by mouth.     • carvedilol CR (COREG CR) 80 MG 24 hr capsule Take 1 capsule by mouth Daily. 90 capsule 3   • Cholecalciferol (VITAMIN D) 1000 UNITS tablet Take 1,000 Units by mouth Daily.     • Cyanocobalamin (VITAMIN B-12 PO) Take by mouth.     • EDARBI 80 MG tablet tablet Take 1 tablet by mouth Daily. 90 tablet 3   • famotidine (PEPCID) 20 MG tablet Take 1 tablet by mouth Daily.     • Ferrous Sulfate (IRON) 325 (65 FE) MG tablet Take by mouth.     • Fluzone High-Dose Quadrivalent 0.7 ML suspension prefilled syringe TO BE ADMINISTERED BY PHARMACIST FOR IMMUNIZATION     • glipizide (GLUCOTROL XL) 10 MG 24 hr tablet TAKE 1 TABLET TWICE A  tablet 4   • JANUVIA 100 MG tablet Take 1 tablet by mouth Daily. 90 tablet 3   • Lancets (ACCU-CHEK MULTICLIX) lancets 3 times a day 300 each 0   • levothyroxine (SYNTHROID, LEVOTHROID) 75 MCG tablet Take 1 tablet by mouth Daily. 90 tablet 3   • pioglitazone-metFORMIN (ACTOPLUS MET)  MG per tablet TAKE 1 TABLET IN THE MORNING AND 2 TABLETS IN THE EVENING 270 tablet 3   • zinc sulfate (ZINCATE) 220 (50 Zn) MG capsule TK 1 C PO QD     • ACCU-CHEK GUIDE test strip 3 times a day 300 each 0     No current facility-administered medications for this visit.     No Known Allergies  Social History     Socioeconomic History   • Marital status:      Spouse name: Not on file   • Number of children: Not on file   • Years of education: Not on file   • Highest education level: Not on file   Tobacco Use   • Smoking status: Never Smoker   • Smokeless tobacco: Never Used   Substance and Sexual Activity   • Alcohol use: No     Comment: stopped       Review of Systems  Review of Systems   Constitutional: Negative for appetite change and fatigue.   Eyes: Negative  "for visual disturbance.   Respiratory: Negative for cough.    Cardiovascular: Negative for leg swelling.   Gastrointestinal: Negative for constipation and diarrhea.   Endocrine: Negative for cold intolerance, heat intolerance, polydipsia, polyphagia and polyuria.   Genitourinary: Negative for frequency.   Neurological: Negative for numbness.        Objective    /62   Ht 182.9 cm (72\")   Wt 110 kg (242 lb 3.2 oz)   BMI 32.85 kg/m²   Physical Exam  Vitals and nursing note reviewed.   Constitutional:       General: He is not in acute distress.     Appearance: Normal appearance. He is well-developed. He is obese. He is not diaphoretic.   HENT:      Head: Normocephalic and atraumatic.      Right Ear: External ear normal.      Left Ear: External ear normal.      Nose: Nose normal.   Eyes:      General:         Right eye: No discharge.         Left eye: No discharge.      Pupils: Pupils are equal, round, and reactive to light.   Neck:      Thyroid: No thyromegaly.      Vascular: No carotid bruit.      Trachea: No tracheal deviation.   Cardiovascular:      Rate and Rhythm: Normal rate and regular rhythm.      Heart sounds: Normal heart sounds. No murmur heard.   No friction rub. No gallop.    Pulmonary:      Effort: Pulmonary effort is normal. No respiratory distress.      Breath sounds: Normal breath sounds. No wheezing or rales.   Abdominal:      General: Bowel sounds are normal. There is no distension.      Palpations: Abdomen is soft.      Tenderness: There is no abdominal tenderness.   Musculoskeletal:         General: No deformity. Normal range of motion.      Cervical back: Normal range of motion and neck supple. No edema or erythema.   Lymphadenopathy:      Cervical: No cervical adenopathy.   Skin:     General: Skin is warm and dry.      Coloration: Skin is not pale.      Findings: No erythema or rash.   Neurological:      Mental Status: He is alert and oriented to person, place, and time.      " Coordination: Coordination normal.   Psychiatric:         Behavior: Behavior normal.         Thought Content: Thought content normal.         Judgment: Judgment normal.         Lab Review  Sodium (mmol/L)   Date Value   03/11/2021 140   09/09/2020 142   03/05/2020 145   01/20/2020 143   09/30/2019 141   04/30/2019 143     Potassium (mmol/L)   Date Value   03/11/2021 4.0   09/09/2020 4.0   03/05/2020 3.9   01/20/2020 4.0   09/30/2019 4.4   04/30/2019 4.2     Chloride (mmol/L)   Date Value   03/11/2021 105   09/09/2020 106   03/05/2020 107   01/20/2020 105   09/30/2019 105   04/30/2019 105     Total CO2 (mmol/L)   Date Value   03/11/2021 27.3   09/09/2020 28.3   03/05/2020 25.5   01/20/2020 27   09/30/2019 26   04/30/2019 32 (H)   01/14/2019 28     BUN (mg/dL)   Date Value   03/11/2021 12   09/09/2020 12   03/05/2020 11   01/20/2020 12   09/30/2019 12   04/30/2019 13     Creatinine (mg/dL)   Date Value   03/11/2021 1.05   09/09/2020 1.21   03/05/2020 1.11   01/20/2020 0.9   09/30/2019 1.1   04/30/2019 1.1     Hemoglobin A1C (%)   Date Value   03/11/2021 6.50 (H)   09/09/2020 6.50 (H)   03/05/2020 6.60 (H)   01/20/2020 6.3 (H)     Triglycerides (mg/dL)   Date Value   03/11/2021 35   09/09/2020 43   03/05/2020 36   01/20/2020 47   01/14/2019 31   01/10/2018 44     LDL Cholesterol  (mg/dL)   Date Value   03/05/2020 93   01/20/2020 92   07/25/2019 93   01/18/2019 88   01/14/2019 89   01/10/2018 103 (H)     LDL Chol Calc (NIH) (mg/dL)   Date Value   03/11/2021 86   09/09/2020 81       Assessment/Plan      1. Controlled type 2 diabetes mellitus with other specified complication, without long-term current use of insulin (CMS/[facility])    2. Hypothyroidism due to Hashimoto's thyroiditis    3. Essential hypertension    4. Vitamin D deficiency    5. Dyslipidemia    .    Medications prescribed:  Outpatient Encounter Medications as of 3/25/2021   Medication Sig Dispense Refill   • amLODIPine (NORVASC) 10 MG tablet Take 1 tablet by  mouth Daily. 90 tablet 3   • aspirin 81 MG EC tablet Take 81 mg by mouth Daily.     • atorvastatin (LIPITOR) 40 MG tablet Take 1 tablet by mouth Daily. 90 tablet 3   • Blood Glucose Monitoring Suppl (ACCU-CHEK GUIDE) w/Device kit 1 kit Daily. 1 kit 1   • CALCIUM PO Take by mouth.     • carvedilol CR (COREG CR) 80 MG 24 hr capsule Take 1 capsule by mouth Daily. 90 capsule 3   • Cholecalciferol (VITAMIN D) 1000 UNITS tablet Take 1,000 Units by mouth Daily.     • Cyanocobalamin (VITAMIN B-12 PO) Take by mouth.     • EDARBI 80 MG tablet tablet Take 1 tablet by mouth Daily. 90 tablet 3   • famotidine (PEPCID) 20 MG tablet Take 1 tablet by mouth Daily.     • Ferrous Sulfate (IRON) 325 (65 FE) MG tablet Take by mouth.     • Fluzone High-Dose Quadrivalent 0.7 ML suspension prefilled syringe TO BE ADMINISTERED BY PHARMACIST FOR IMMUNIZATION     • glipizide (GLUCOTROL XL) 10 MG 24 hr tablet TAKE 1 TABLET TWICE A  tablet 4   • JANUVIA 100 MG tablet Take 1 tablet by mouth Daily. 90 tablet 3   • Lancets (ACCU-CHEK MULTICLIX) lancets 3 times a day 300 each 0   • levothyroxine (SYNTHROID, LEVOTHROID) 75 MCG tablet Take 1 tablet by mouth Daily. 90 tablet 3   • pioglitazone-metFORMIN (ACTOPLUS MET)  MG per tablet TAKE 1 TABLET IN THE MORNING AND 2 TABLETS IN THE EVENING 270 tablet 3   • zinc sulfate (ZINCATE) 220 (50 Zn) MG capsule TK 1 C PO QD     • ACCU-CHEK GUIDE test strip 3 times a day 300 each 0     No facility-administered encounter medications on file as of 3/25/2021.       Orders placed during this encounter include:  No orders of the defined types were placed in this encounter.      Metabolically and clinically patient presents stable.  His blood pressures in satisfactory range her weight is stable.  His hemoglobin A1c reflects good glycemic control.  He denies any hypoglycemic events.  He was educated today regarding mechanism of action of his current medications.  He is at risk for hypoglycemia.  He has been  advised not to take Glucotrol and go too long without eating.  He has been advised to check his blood sugar should he have any symptoms of hypoglycemia.  Cholesterol and vitamin D are stable.  Blood pressures in satisfactory range.  He has been advised to follow-up with his provider in 6 months.  He may choose to follow with Dr. Mccabe.  He has been advised that he is a welcome patient here should he choose to follow-up in this office he can call and schedule an appointment.

## 2021-05-29 RX ORDER — PIOGLITAZONE HCL AND METFORMIN HCL 850; 15 MG/1; MG/1
TABLET ORAL
Qty: 270 TABLET | Refills: 1 | Status: SHIPPED | OUTPATIENT
Start: 2021-05-29 | End: 2021-11-01 | Stop reason: SDUPTHER

## 2021-05-29 RX ORDER — AMLODIPINE BESYLATE 10 MG/1
TABLET ORAL
Qty: 90 TABLET | Refills: 1 | Status: SHIPPED | OUTPATIENT
Start: 2021-05-29 | End: 2021-11-01 | Stop reason: SDUPTHER

## 2021-05-29 RX ORDER — LEVOTHYROXINE SODIUM 0.07 MG/1
TABLET ORAL
Qty: 90 TABLET | Refills: 1 | Status: SHIPPED | OUTPATIENT
Start: 2021-05-29 | End: 2021-11-01 | Stop reason: SDUPTHER

## 2021-05-29 RX ORDER — ATORVASTATIN CALCIUM 40 MG/1
TABLET, FILM COATED ORAL
Qty: 90 TABLET | Refills: 1 | Status: SHIPPED | OUTPATIENT
Start: 2021-05-29 | End: 2021-11-01 | Stop reason: SDUPTHER

## 2021-05-29 RX ORDER — AZILSARTAN KAMEDOXOMIL 80 MG/1
TABLET ORAL
Qty: 90 TABLET | Refills: 1 | Status: SHIPPED | OUTPATIENT
Start: 2021-05-29 | End: 2021-11-01 | Stop reason: SDUPTHER

## 2021-05-29 RX ORDER — GLIPIZIDE 10 MG/1
TABLET, FILM COATED, EXTENDED RELEASE ORAL
Qty: 180 TABLET | Refills: 1 | Status: SHIPPED | OUTPATIENT
Start: 2021-05-29 | End: 2021-11-01 | Stop reason: SDUPTHER

## 2021-05-29 RX ORDER — CARVEDILOL PHOSPHATE 80 MG/1
CAPSULE, EXTENDED RELEASE ORAL
Qty: 90 CAPSULE | Refills: 1 | Status: SHIPPED | OUTPATIENT
Start: 2021-05-29 | End: 2021-11-01 | Stop reason: SDUPTHER

## 2021-05-29 RX ORDER — SITAGLIPTIN 100 MG/1
TABLET, FILM COATED ORAL
Qty: 90 TABLET | Refills: 1 | Status: SHIPPED | OUTPATIENT
Start: 2021-05-29 | End: 2021-11-01 | Stop reason: SDUPTHER

## 2021-10-29 RX ORDER — ATORVASTATIN CALCIUM 40 MG/1
TABLET, FILM COATED ORAL
Qty: 90 TABLET | Refills: 3 | OUTPATIENT
Start: 2021-10-29

## 2021-10-29 RX ORDER — AZILSARTAN KAMEDOXOMIL 80 MG/1
TABLET ORAL
Qty: 90 TABLET | Refills: 3 | OUTPATIENT
Start: 2021-10-29

## 2021-10-29 RX ORDER — CARVEDILOL PHOSPHATE 80 MG/1
CAPSULE, EXTENDED RELEASE ORAL
Qty: 90 CAPSULE | Refills: 3 | OUTPATIENT
Start: 2021-10-29

## 2021-10-29 RX ORDER — PIOGLITAZONE HCL AND METFORMIN HCL 850; 15 MG/1; MG/1
TABLET ORAL
Qty: 270 TABLET | Refills: 3 | OUTPATIENT
Start: 2021-10-29

## 2021-10-29 RX ORDER — AMLODIPINE BESYLATE 10 MG/1
TABLET ORAL
Qty: 90 TABLET | Refills: 3 | OUTPATIENT
Start: 2021-10-29

## 2021-10-29 RX ORDER — SITAGLIPTIN 100 MG/1
TABLET, FILM COATED ORAL
Qty: 90 TABLET | Refills: 3 | OUTPATIENT
Start: 2021-10-29

## 2021-10-29 RX ORDER — LEVOTHYROXINE SODIUM 0.07 MG/1
TABLET ORAL
Qty: 90 TABLET | Refills: 3 | OUTPATIENT
Start: 2021-10-29

## 2021-10-29 RX ORDER — GLIPIZIDE 10 MG/1
TABLET, FILM COATED, EXTENDED RELEASE ORAL
Qty: 180 TABLET | Refills: 3 | OUTPATIENT
Start: 2021-10-29

## 2021-11-01 ENCOUNTER — OFFICE VISIT (OUTPATIENT)
Dept: ENDOCRINOLOGY | Age: 71
End: 2021-11-01

## 2021-11-01 VITALS
BODY MASS INDEX: 32.2 KG/M2 | HEART RATE: 76 BPM | SYSTOLIC BLOOD PRESSURE: 124 MMHG | OXYGEN SATURATION: 98 % | HEIGHT: 73 IN | WEIGHT: 243 LBS | DIASTOLIC BLOOD PRESSURE: 70 MMHG | RESPIRATION RATE: 20 BRPM

## 2021-11-01 DIAGNOSIS — I10 PRIMARY HYPERTENSION: ICD-10-CM

## 2021-11-01 DIAGNOSIS — E78.2 MIXED HYPERLIPIDEMIA: ICD-10-CM

## 2021-11-01 DIAGNOSIS — E06.3 HYPOTHYROIDISM DUE TO HASHIMOTO'S THYROIDITIS: ICD-10-CM

## 2021-11-01 DIAGNOSIS — E55.9 VITAMIN D DEFICIENCY: ICD-10-CM

## 2021-11-01 DIAGNOSIS — E03.8 HYPOTHYROIDISM DUE TO HASHIMOTO'S THYROIDITIS: ICD-10-CM

## 2021-11-01 DIAGNOSIS — E11.8 CONTROLLED TYPE 2 DIABETES MELLITUS WITH COMPLICATION, WITHOUT LONG-TERM CURRENT USE OF INSULIN (HCC): ICD-10-CM

## 2021-11-01 DIAGNOSIS — E11.65 UNCONTROLLED TYPE 2 DIABETES MELLITUS WITH HYPERGLYCEMIA (HCC): Primary | ICD-10-CM

## 2021-11-01 PROCEDURE — 99214 OFFICE O/P EST MOD 30 MIN: CPT | Performed by: INTERNAL MEDICINE

## 2021-11-01 RX ORDER — SITAGLIPTIN 100 MG/1
100 TABLET, FILM COATED ORAL DAILY
Qty: 90 TABLET | Refills: 1 | Status: SHIPPED | OUTPATIENT
Start: 2021-11-01 | End: 2022-04-18

## 2021-11-01 RX ORDER — AZILSARTAN KAMEDOXOMIL 80 MG/1
80 TABLET ORAL DAILY
Qty: 90 TABLET | Refills: 1 | Status: SHIPPED | OUTPATIENT
Start: 2021-11-01

## 2021-11-01 RX ORDER — BLOOD SUGAR DIAGNOSTIC
STRIP MISCELLANEOUS
Qty: 300 EACH | Refills: 3 | Status: SHIPPED | OUTPATIENT
Start: 2021-11-01 | End: 2021-11-04 | Stop reason: CLARIF

## 2021-11-01 RX ORDER — PIOGLITAZONE HCL AND METFORMIN HCL 850; 15 MG/1; MG/1
1 TABLET ORAL 2 TIMES DAILY WITH MEALS
Qty: 180 TABLET | Refills: 1 | Status: SHIPPED | OUTPATIENT
Start: 2021-11-01 | End: 2021-11-04 | Stop reason: SDUPTHER

## 2021-11-01 RX ORDER — AMLODIPINE BESYLATE 10 MG/1
10 TABLET ORAL DAILY
Qty: 90 TABLET | Refills: 1 | Status: SHIPPED | OUTPATIENT
Start: 2021-11-01 | End: 2022-04-18

## 2021-11-01 RX ORDER — GLIPIZIDE 10 MG/1
10 TABLET, FILM COATED, EXTENDED RELEASE ORAL 2 TIMES DAILY
Qty: 180 TABLET | Refills: 1 | Status: SHIPPED | OUTPATIENT
Start: 2021-11-01 | End: 2022-04-18

## 2021-11-01 RX ORDER — CARVEDILOL PHOSPHATE 80 MG/1
80 CAPSULE, EXTENDED RELEASE ORAL DAILY
Qty: 90 CAPSULE | Refills: 1 | Status: SHIPPED | OUTPATIENT
Start: 2021-11-01

## 2021-11-01 RX ORDER — LEVOTHYROXINE SODIUM 0.07 MG/1
75 TABLET ORAL DAILY
Qty: 90 TABLET | Refills: 1 | Status: SHIPPED | OUTPATIENT
Start: 2021-11-01 | End: 2022-04-18

## 2021-11-01 RX ORDER — ATORVASTATIN CALCIUM 40 MG/1
40 TABLET, FILM COATED ORAL DAILY
Qty: 90 TABLET | Refills: 1 | Status: SHIPPED | OUTPATIENT
Start: 2021-11-01 | End: 2022-04-18

## 2021-11-01 NOTE — ASSESSMENT & PLAN NOTE
Hypertension stable.  No renal failure.  Continue ARB agent, beta-blocker, calcium channel blocker and aspirin.  Encourage patient to walk 30 minutes twice a day minimum daily.  Monitor lab work ordered continue pharmacotherapy.

## 2021-11-01 NOTE — ASSESSMENT & PLAN NOTE
Type 2 diabetes over 40 years since the 1980s controlled on oral agents.  Comorbidities include BMI 32, hypertension, hyperlipidemia, vitamin D deficiency, hypothyroidism.  Recommend continue pharmacotherapy no change.  Labs ordered.  Counseling regarding foot care: Daily visualization, lotion, attention to thickened toenails.

## 2021-11-01 NOTE — PROGRESS NOTES
Chief Complaint  Chief Complaint   Patient presents with   • Diabetes     FUP DM2 LAST EYE EXAM 08/21  CHECKS BS QOD AVG BS  MED EVAL REFILLS    • Hyperlipidemia   • Hypertension   • Hypothyroidism       Subjective          History of Present Illness    Wilver Segura Sr. 71 y.o. presents with Type 2 diabetes controlled as a new patient with me as an established patient of this endocrine clinic.  He presents for type 2 diabetes management..  Comorbidities include normocytic anemia, thrombocytopenia, neutropenia recently seen by hematologist on 5/24/2021.  Patient recalls entire family has pancytopenia.  BMI 32, hypertension, hyperlipidemia, vitamin D deficiency, hypotension    #Type 2 diabetes- Diagnosed about 40 years ago in the 1980s.  Grandparents children and siblings have type 2 diabetes.  His brother in 2021 had bilateral leg amputations and is now in a nursing home.  Today in clinic pt reports being on oral agents only.  Pioglitazone/metformin  mg, glipizide XL 10 mg, Januvia 100 mg.  Glipizide XL 10 mg daily  Januvia 100 mg daily.     Checks BG -intermittently IN THE AM.  Lowest blood glucose 80 mg/DL over the last month.  Highest blood glucose 102/DL last month.  Sensor -   Dm retinopathy -none,Last eye exam -8/2021.  Cataracts resected 8/20/2021 and 10/20/2019 dm nephropathy -   Dm neuropathy -9,Dm neuropathy meds -   CAD -NO  CVA - NO, history of irregular heart rate improved rate on carvedilol  Episodes of hypoglycemia -none  Pt is physically active. weight has been stable.   Pt tries to follow DM diet for most part.     Prevention: He is on an ARB 2 receptor blocker: EBARBI, azilsartan, 80 mg daily  Aspirin 81 mg daily    #Hypertension stable on carvedilol CR 80 mg daily, amlodipine 10 mg daily and azilsartan.  Blood pressure 124/70.  He denies edema, shortness of breath, lightheadedness  #Hyperlipidemia stable on atorvastatin 40 mg daily and aspirin 81 mg daily.    #Hypothyroid acquired  "stable.  Diagnosed about 4 5 years ago through lab work.  He has been on the same dose of levothyroxine 75 mcg daily for over a year.  He denies any new edema despite taking amlodipine.  He denies new fatigue.  His exercise has decreased over the pandemic.  No changes in skin or hair.    #Vitamin D deficiency: Patient has no history of hypercalcemia or kidney stones.  He takes nonprescription vitamin D-3000 units daily.      ACCU-CHEK GUIDE test strip    amLODIPine (NORVASC) 10 MG tablet    aspirin 81 MG EC tablet    atorvastatin (LIPITOR) 40 MG tablet    Blood Glucose Monitoring Suppl (ACCU-CHEK GUIDE) w/Device kit    CALCIUM PO    carvedilol CR (COREG CR) 80 MG 24 hr capsule    Cholecalciferol (VITAMIN D) 1000 UNITS tablet    Cyanocobalamin (VITAMIN B-12 PO)    Edarbi 80 MG tablet tablet    famotidine (PEPCID) 20 MG tablet    Ferrous Sulfate (IRON) 325 (65 FE) MG tablet    Fluzone High-Dose Quadrivalent 0.7 ML suspension prefilled syringe    glipizide (GLUCOTROL XL) 10 MG 24 hr tablet    Januvia 100 MG tablet    Lancets (ACCU-CHEK MULTICLIX) lancets    levothyroxine (SYNTHROID, LEVOTHROID) 75 MCG tablet    pioglitazone-metFORMIN (ACTOPLUS MET)  MG per tablet    zinc sulfate (ZINCATE) 220 (50 Zn) MG capsule      I have reviewed the patient's allergies, medicines, past medical hx, family hx and social hx in detail.    Objective   Vital Signs:   /70 (BP Location: Left arm, Patient Position: Sitting, Cuff Size: Adult)   Pulse 76   Resp 20   Ht 185.4 cm (73\")   Wt 110 kg (243 lb)   SpO2 98%   BMI 32.06 kg/m²   Physical Exam   Physical Exam  Vitals and nursing note reviewed.   HENT:      Head: Normocephalic.      Mouth/Throat:      Mouth: Mucous membranes are moist.   Cardiovascular:      Rate and Rhythm: Normal rate.      Pulses:           Dorsalis pedis pulses are 3+ on the right side and 3+ on the left side.        Posterior tibial pulses are 3+ on the right side and 3+ on the left side.      Heart " sounds: Normal heart sounds.   Pulmonary:      Effort: Pulmonary effort is normal.      Breath sounds: Normal breath sounds. No wheezing or rhonchi.   Abdominal:      General: Bowel sounds are normal.      Palpations: Abdomen is soft.   Musculoskeletal:         General: No swelling. Normal range of motion.      Cervical back: Normal range of motion and neck supple.   Feet:      Right foot:      Skin integrity: Dry skin present.      Toenail Condition: Right toenails are abnormally thick.      Left foot:      Skin integrity: Dry skin present.      Toenail Condition: Left toenails are abnormally thick.      Comments: Counseling: Nails are well trimmed.  Thickened toenails use a  not a sharp object to thin nails.  Consider podiatry appointment.  Skin:     General: Skin is warm and dry.   Neurological:      Mental Status: He is alert and oriented to person, place, and time. Mental status is at baseline.   Psychiatric:         Mood and Affect: Mood normal.         Behavior: Behavior normal.         Judgment: Judgment normal.       Result Review :   The following data was reviewed by: Michelle Queen MD on 11/01/2021:  Orders Only on 03/11/2021   Component Date Value Ref Range Status   • Glucose 03/11/2021 72  65 - 99 mg/dL Final   • BUN 03/11/2021 12  8 - 23 mg/dL Final   • Creatinine 03/11/2021 1.05  0.76 - 1.27 mg/dL Final   • eGFR Non  Am 03/11/2021 70  >60 mL/min/1.73 Final    Comment: GFR Normal >60  Chronic Kidney Disease <60  Kidney Failure <15     • eGFR  Am 03/11/2021 85  >60 mL/min/1.73 Final   • BUN/Creatinine Ratio 03/11/2021 11.4  7.0 - 25.0 Final   • Sodium 03/11/2021 140  136 - 145 mmol/L Final   • Potassium 03/11/2021 4.0  3.5 - 5.2 mmol/L Final   • Chloride 03/11/2021 105  98 - 107 mmol/L Final   • Total CO2 03/11/2021 27.3  22.0 - 29.0 mmol/L Final   • Calcium 03/11/2021 8.8  8.6 - 10.5 mg/dL Final   • Total Protein 03/11/2021 5.5* 6.0 - 8.5 g/dL Final   • Albumin 03/11/2021  3.50  3.50 - 5.20 g/dL Final   • Globulin 03/11/2021 2.0  gm/dL Final   • A/G Ratio 03/11/2021 1.8  g/dL Final   • Total Bilirubin 03/11/2021 0.7  0.0 - 1.2 mg/dL Final   • Alkaline Phosphatase 03/11/2021 50  39 - 117 U/L Final   • AST (SGOT) 03/11/2021 19  1 - 40 U/L Final   • ALT (SGPT) 03/11/2021 13  1 - 41 U/L Final   • Total Cholesterol 03/11/2021 140  0 - 200 mg/dL Final    Comment: Cholesterol Reference Ranges  (U.S. Department of Health and Human Services ATP III  Classifications)  Desirable          <200 mg/dL  Borderline High    200-239 mg/dL  High Risk          >240 mg/dL  Triglyceride Reference Ranges  (U.S. Department of Health and Human Services ATP III  Classifications)  Normal           <150 mg/dL  Borderline High  150-199 mg/dL  High             200-499 mg/dL  Very High        >500 mg/dL  HDL Reference Ranges  (U.S. Department of Health and Human Services ATP III  Classifcations)  Low     <40 mg/dl (major risk factor for CHD)  High    >60 mg/dl ('negative' risk factor for CHD)  LDL Reference Ranges  (U.S. Department of Health and Human Services ATP III  Classifcations)  Optimal          <100 mg/dL  Near Optimal     100-129 mg/dL  Borderline High  130-159 mg/dL  High             160-189 mg/dL  Very High        >189 mg/dL     • Triglycerides 03/11/2021 35  0 - 150 mg/dL Final   • HDL Cholesterol 03/11/2021 45  40 - 60 mg/dL Final   • VLDL Cholesterol Jack 03/11/2021 9  5 - 40 mg/dL Final   • LDL Chol Calc ([facility]) 03/11/2021 86  0 - 100 mg/dL Final   • Hemoglobin A1C 03/11/2021 6.50* 4.80 - 5.60 % Final    Comment: Hemoglobin A1C Ranges:  Increased Risk for Diabetes  5.7% to 6.4%  Diabetes                     >= 6.5%  Diabetic Goal                < 7.0%     • Free T4 03/11/2021 1.26  0.93 - 1.70 ng/dL Final    Results may be falsely increased if patient taking Biotin.   • TSH 03/11/2021 4.170  0.270 - 4.200 uIU/mL Final   • C-Peptide 03/11/2021 3.0  1.1 - 4.4 ng/mL Final    C-Peptide reference interval is  for fasting patients.   • PSA 03/11/2021 4.360* 0.000 - 4.000 ng/mL Final    Results may be falsely decreased if patient taking Biotin.   • 25 Hydroxy, Vitamin D 03/11/2021 37.9  30.0 - 100.0 ng/ml Final    Comment: Results may be falsely increased if patient taking Biotin.  Reference Range for Total Vitamin D 25(OH)  Deficiency <20.0 ng/mL  Insufficiency 21-29 ng/mL  Sufficiency  ng/mL  Toxicity >100 ng/ml     • T3, Free 03/11/2021 2.7  2.0 - 4.4 pg/mL Final   • Unable to Void 03/11/2021 Comment   Final    Comment: The patient was not able to render a urine sample and has been  instructed to return for a urine collection at their earliest  convenience.  The urine testing that you have requested has  been deleted from this report.  When the patient returns and  provides a urine specimen, the urine testing will be performed  and separately reported.     • Interpretation 03/11/2021 Note   Final    Supplemental report is available.   • PDF Image 03/11/2021 Not applicable   Final        Assessment and Plan    Problem List Items Addressed This Visit        Other    Hyperlipidemia    Current Assessment & Plan     Stable.  Continue pharmacotherapy.  Lab work ordered.         Relevant Medications    pioglitazone-metFORMIN (ACTOPLUS MET)  MG per tablet    levothyroxine (SYNTHROID, LEVOTHROID) 75 MCG tablet    Januvia 100 MG tablet    glipizide (GLUCOTROL XL) 10 MG 24 hr tablet    Edarbi 80 MG tablet tablet    amLODIPine (NORVASC) 10 MG tablet    atorvastatin (LIPITOR) 40 MG tablet    carvedilol CR (COREG CR) 80 MG 24 hr capsule    Other Relevant Orders    Lipid Panel    Hypertension    Current Assessment & Plan     Hypertension stable.  No renal failure.  Continue ARB agent, beta-blocker, calcium channel blocker and aspirin.  Encourage patient to walk 30 minutes twice a day minimum daily.  Monitor lab work ordered continue pharmacotherapy.         Relevant Medications    pioglitazone-metFORMIN (ACTOPLUS MET)   MG per tablet    levothyroxine (SYNTHROID, LEVOTHROID) 75 MCG tablet    Januvia 100 MG tablet    glipizide (GLUCOTROL XL) 10 MG 24 hr tablet    Edarbi 80 MG tablet tablet    amLODIPine (NORVASC) 10 MG tablet    atorvastatin (LIPITOR) 40 MG tablet    carvedilol CR (COREG CR) 80 MG 24 hr capsule    Hypothyroidism    Current Assessment & Plan     Acquired x4 to 5 years.  Stable on levothyroxine 75 mcg daily.  Renew pharmacotherapy.  Monitor with lab work now.         Relevant Medications    pioglitazone-metFORMIN (ACTOPLUS MET)  MG per tablet    levothyroxine (SYNTHROID, LEVOTHROID) 75 MCG tablet    Januvia 100 MG tablet    glipizide (GLUCOTROL XL) 10 MG 24 hr tablet    Edarbi 80 MG tablet tablet    amLODIPine (NORVASC) 10 MG tablet    atorvastatin (LIPITOR) 40 MG tablet    carvedilol CR (COREG CR) 80 MG 24 hr capsule    Other Relevant Orders    TSH Rfx On Abnormal To Free T4    Uncontrolled type 2 diabetes mellitus (HCC) - Primary    Relevant Medications    pioglitazone-metFORMIN (ACTOPLUS MET)  MG per tablet    levothyroxine (SYNTHROID, LEVOTHROID) 75 MCG tablet    Januvia 100 MG tablet    glipizide (GLUCOTROL XL) 10 MG 24 hr tablet    Edarbi 80 MG tablet tablet    amLODIPine (NORVASC) 10 MG tablet    atorvastatin (LIPITOR) 40 MG tablet    carvedilol CR (COREG CR) 80 MG 24 hr capsule    Other Relevant Orders    Comprehensive Metabolic Panel    Hemoglobin A1c    Microalbumin / Creatinine Urine Ratio - Urine, Clean Catch    Vitamin D deficiency    Current Assessment & Plan     Monitor with lab draw.  Nonprescription pharmacotherapy continue.         Relevant Orders    Vitamin D 25 Hydroxy    Controlled type 2 diabetes mellitus with complication, without long-term current use of insulin (HCC)    Current Assessment & Plan     Type 2 diabetes over 40 years since the 1980s controlled on oral agents.  Comorbidities include BMI 32, hypertension, hyperlipidemia, vitamin D deficiency,  "hypothyroidism.  Recommend continue pharmacotherapy no change.  Labs ordered.  Counseling regarding foot care: Daily visualization, lotion, attention to thickened toenails.         Relevant Medications    pioglitazone-metFORMIN (ACTOPLUS MET)  MG per tablet    Januvia 100 MG tablet    glipizide (GLUCOTROL XL) 10 MG 24 hr tablet          Interpreted the blood work-up/imaging results performed by the primary care/consulting physician -    Refills sent to pharmacy    Follow Up {Instructions Charge Capture  Follow-up Communications :23}    Patient was given instructions and counseling regarding her condition or for health maintenance advice. Please see specific information pulled into the AVS if appropriate.       Thank you for asking me to see your patient, Wilver Segura . in consultation.         Michelle Queen MD  11/01/21      EMR Dragon / transcription disclaimer:     \"Dictated utilizing Dragon dictation\".         "

## 2021-11-01 NOTE — ASSESSMENT & PLAN NOTE
Acquired x4 to 5 years.  Stable on levothyroxine 75 mcg daily.  Renew pharmacotherapy.  Monitor with lab work now.

## 2021-11-02 LAB
25(OH)D3+25(OH)D2 SERPL-MCNC: 34 NG/ML (ref 30–100)
ALBUMIN SERPL-MCNC: 4 G/DL (ref 3.7–4.7)
ALBUMIN/CREAT UR: <4 MG/G CREAT (ref 0–29)
ALBUMIN/GLOB SERPL: 2 {RATIO} (ref 1.2–2.2)
ALP SERPL-CCNC: 69 IU/L (ref 44–121)
ALT SERPL-CCNC: 16 IU/L (ref 0–44)
AST SERPL-CCNC: 18 IU/L (ref 0–40)
BILIRUB SERPL-MCNC: 0.5 MG/DL (ref 0–1.2)
BUN SERPL-MCNC: 18 MG/DL (ref 8–27)
BUN/CREAT SERPL: 16 (ref 10–24)
CALCIUM SERPL-MCNC: 8.9 MG/DL (ref 8.6–10.2)
CHLORIDE SERPL-SCNC: 105 MMOL/L (ref 96–106)
CHOLEST SERPL-MCNC: 159 MG/DL (ref 100–199)
CO2 SERPL-SCNC: 24 MMOL/L (ref 20–29)
CREAT SERPL-MCNC: 1.16 MG/DL (ref 0.76–1.27)
CREAT UR-MCNC: 77.3 MG/DL
GLOBULIN SER CALC-MCNC: 2 G/DL (ref 1.5–4.5)
GLUCOSE SERPL-MCNC: 133 MG/DL (ref 65–99)
HBA1C MFR BLD: 6.6 % (ref 4.8–5.6)
HDLC SERPL-MCNC: 51 MG/DL
IMP & REVIEW OF LAB RESULTS: NORMAL
LDLC SERPL CALC-MCNC: 98 MG/DL (ref 0–99)
MICROALBUMIN UR-MCNC: <3 UG/ML
POTASSIUM SERPL-SCNC: 4.2 MMOL/L (ref 3.5–5.2)
PROT SERPL-MCNC: 6 G/DL (ref 6–8.5)
SODIUM SERPL-SCNC: 141 MMOL/L (ref 134–144)
TRIGL SERPL-MCNC: 50 MG/DL (ref 0–149)
TSH SERPL DL<=0.005 MIU/L-ACNC: 2.67 UIU/ML (ref 0.45–4.5)
VLDLC SERPL CALC-MCNC: 10 MG/DL (ref 5–40)

## 2021-11-04 ENCOUNTER — TELEPHONE (OUTPATIENT)
Dept: ENDOCRINOLOGY | Age: 71
End: 2021-11-04

## 2021-11-04 DIAGNOSIS — I10 PRIMARY HYPERTENSION: ICD-10-CM

## 2021-11-04 DIAGNOSIS — E06.3 HYPOTHYROIDISM DUE TO HASHIMOTO'S THYROIDITIS: ICD-10-CM

## 2021-11-04 DIAGNOSIS — E03.8 HYPOTHYROIDISM DUE TO HASHIMOTO'S THYROIDITIS: ICD-10-CM

## 2021-11-04 DIAGNOSIS — E78.2 MIXED HYPERLIPIDEMIA: ICD-10-CM

## 2021-11-04 DIAGNOSIS — E11.65 UNCONTROLLED TYPE 2 DIABETES MELLITUS WITH HYPERGLYCEMIA (HCC): ICD-10-CM

## 2021-11-04 RX ORDER — PIOGLITAZONE HCL AND METFORMIN HCL 850; 15 MG/1; MG/1
1 TABLET ORAL 2 TIMES DAILY WITH MEALS
Qty: 180 TABLET | Refills: 1 | Status: SHIPPED | OUTPATIENT
Start: 2021-11-04 | End: 2021-11-05 | Stop reason: SDUPTHER

## 2021-11-04 RX ORDER — BLOOD-GLUCOSE METER
1 EACH MISCELLANEOUS 3 TIMES DAILY
Qty: 1 KIT | Refills: 0 | Status: SHIPPED | OUTPATIENT
Start: 2021-11-04

## 2021-11-04 RX ORDER — BLOOD-GLUCOSE METER
EACH MISCELLANEOUS
COMMUNITY
End: 2021-11-04 | Stop reason: SDUPTHER

## 2021-11-04 NOTE — TELEPHONE ENCOUNTER
Patient states he needs a new meter and test strips because his insurance no longer covers the one he had.    States the pharmacy told him if we send a rx stating dispense what's covered by insurance they will be able to get him a set up that will be covered.       Also,     Patient is needing a PA for pioglitazone-metFORMIN (ACTOPLUS MET)  MG per tablet

## 2021-11-05 ENCOUNTER — TELEPHONE (OUTPATIENT)
Dept: ENDOCRINOLOGY | Age: 71
End: 2021-11-05

## 2021-11-05 DIAGNOSIS — E78.2 MIXED HYPERLIPIDEMIA: ICD-10-CM

## 2021-11-05 DIAGNOSIS — I10 PRIMARY HYPERTENSION: ICD-10-CM

## 2021-11-05 DIAGNOSIS — E11.65 UNCONTROLLED TYPE 2 DIABETES MELLITUS WITH HYPERGLYCEMIA (HCC): ICD-10-CM

## 2021-11-05 DIAGNOSIS — E03.8 HYPOTHYROIDISM DUE TO HASHIMOTO'S THYROIDITIS: ICD-10-CM

## 2021-11-05 DIAGNOSIS — E06.3 HYPOTHYROIDISM DUE TO HASHIMOTO'S THYROIDITIS: ICD-10-CM

## 2021-11-05 RX ORDER — PIOGLITAZONE HCL AND METFORMIN HCL 850; 15 MG/1; MG/1
1 TABLET ORAL 2 TIMES DAILY WITH MEALS
Qty: 180 TABLET | Refills: 1 | Status: SHIPPED | OUTPATIENT
Start: 2021-11-05 | End: 2021-11-08 | Stop reason: SDUPTHER

## 2021-11-05 NOTE — TELEPHONE ENCOUNTER
Patient walked in and needs Piogitazone/metformin tabs 15/850 [facility]

## 2021-11-08 DIAGNOSIS — E78.2 MIXED HYPERLIPIDEMIA: ICD-10-CM

## 2021-11-08 DIAGNOSIS — E06.3 HYPOTHYROIDISM DUE TO HASHIMOTO'S THYROIDITIS: ICD-10-CM

## 2021-11-08 DIAGNOSIS — I10 PRIMARY HYPERTENSION: ICD-10-CM

## 2021-11-08 DIAGNOSIS — E11.65 UNCONTROLLED TYPE 2 DIABETES MELLITUS WITH HYPERGLYCEMIA (HCC): ICD-10-CM

## 2021-11-08 DIAGNOSIS — E03.8 HYPOTHYROIDISM DUE TO HASHIMOTO'S THYROIDITIS: ICD-10-CM

## 2021-11-08 RX ORDER — PIOGLITAZONE HCL AND METFORMIN HCL 850; 15 MG/1; MG/1
1 TABLET ORAL 3 TIMES DAILY
Qty: 270 TABLET | Refills: 1 | Status: SHIPPED | OUTPATIENT
Start: 2021-11-08 | End: 2022-01-06 | Stop reason: SDUPTHER

## 2021-11-08 NOTE — TELEPHONE ENCOUNTER
Pt informed of meds spoke with tesfaye at [facility] meds actoplus metformin  verified instructions informed me pt will get meds this week called pt and informed of status

## 2021-11-08 NOTE — TELEPHONE ENCOUNTER
Patient states he is having trouble getting this medication because of dosage issues. He states [facility] told him someone from our office needs to call them at [phone] to get the dosage issues fixed so they can mail his medication

## 2022-01-06 ENCOUNTER — TELEPHONE (OUTPATIENT)
Dept: ENDOCRINOLOGY | Age: 72
End: 2022-01-06

## 2022-01-06 DIAGNOSIS — E03.8 HYPOTHYROIDISM DUE TO HASHIMOTO'S THYROIDITIS: ICD-10-CM

## 2022-01-06 DIAGNOSIS — I10 PRIMARY HYPERTENSION: ICD-10-CM

## 2022-01-06 DIAGNOSIS — E78.2 MIXED HYPERLIPIDEMIA: ICD-10-CM

## 2022-01-06 DIAGNOSIS — E06.3 HYPOTHYROIDISM DUE TO HASHIMOTO'S THYROIDITIS: ICD-10-CM

## 2022-01-06 DIAGNOSIS — E11.65 UNCONTROLLED TYPE 2 DIABETES MELLITUS WITH HYPERGLYCEMIA: ICD-10-CM

## 2022-01-06 RX ORDER — PIOGLITAZONE HCL AND METFORMIN HCL 850; 15 MG/1; MG/1
1 TABLET ORAL 3 TIMES DAILY
Qty: 270 TABLET | Refills: 1 | Status: SHIPPED | OUTPATIENT
Start: 2022-01-06 | End: 2022-01-31 | Stop reason: CLARIF

## 2022-01-31 ENCOUNTER — TELEPHONE (OUTPATIENT)
Dept: ENDOCRINOLOGY | Age: 72
End: 2022-01-31

## 2022-01-31 DIAGNOSIS — E11.8 CONTROLLED TYPE 2 DIABETES MELLITUS WITH COMPLICATION, WITHOUT LONG-TERM CURRENT USE OF INSULIN: Primary | ICD-10-CM

## 2022-01-31 RX ORDER — PIOGLITAZONEHYDROCHLORIDE 30 MG/1
30 TABLET ORAL DAILY
Qty: 90 TABLET | Refills: 1 | Status: SHIPPED | OUTPATIENT
Start: 2022-01-31 | End: 2022-09-19 | Stop reason: SDUPTHER

## 2022-01-31 RX ORDER — METFORMIN HYDROCHLORIDE 750 MG/1
750 TABLET, EXTENDED RELEASE ORAL
Qty: 90 TABLET | Refills: 1 | Status: SHIPPED | OUTPATIENT
Start: 2022-01-31 | End: 2022-09-20 | Stop reason: SDUPTHER

## 2022-01-31 NOTE — TELEPHONE ENCOUNTER
Called pt  asked me to to let him know of the medication change due to insurance and Rx coverage. The pt stated they got the Rx from [facility] so it may not have to be done this way? But the dr has sent in the Rxs

## 2022-01-31 NOTE — TELEPHONE ENCOUNTER
Please call patient.     [facility] has sent a written notice of medication non coverage.     Medication not covered : pioglitazone -metformine  mg 3 tabs  daily.     Note to staff:  Notify patient of new medications  Replacing the mixed med above.     Pioglitazone 15 mg daily   Metformin  mg daily   Sent to [facility].     Michelle Queen MD 1/31/22

## 2022-04-15 DIAGNOSIS — E11.65 UNCONTROLLED TYPE 2 DIABETES MELLITUS WITH HYPERGLYCEMIA: ICD-10-CM

## 2022-04-15 DIAGNOSIS — E78.2 MIXED HYPERLIPIDEMIA: ICD-10-CM

## 2022-04-15 DIAGNOSIS — E06.3 HYPOTHYROIDISM DUE TO HASHIMOTO'S THYROIDITIS: ICD-10-CM

## 2022-04-15 DIAGNOSIS — E03.8 HYPOTHYROIDISM DUE TO HASHIMOTO'S THYROIDITIS: ICD-10-CM

## 2022-04-15 DIAGNOSIS — I10 PRIMARY HYPERTENSION: ICD-10-CM

## 2022-04-18 RX ORDER — GLIPIZIDE 10 MG/1
TABLET, FILM COATED, EXTENDED RELEASE ORAL
Qty: 180 TABLET | Refills: 1 | Status: SHIPPED | OUTPATIENT
Start: 2022-04-18 | End: 2022-11-01 | Stop reason: SDUPTHER

## 2022-04-18 RX ORDER — SITAGLIPTIN 100 MG/1
TABLET, FILM COATED ORAL
Qty: 90 TABLET | Refills: 1 | Status: SHIPPED | OUTPATIENT
Start: 2022-04-18 | End: 2022-11-01 | Stop reason: SDUPTHER

## 2022-04-18 RX ORDER — ATORVASTATIN CALCIUM 40 MG/1
TABLET, FILM COATED ORAL
Qty: 90 TABLET | Refills: 1 | Status: SHIPPED | OUTPATIENT
Start: 2022-04-18 | End: 2022-11-01 | Stop reason: SDUPTHER

## 2022-04-18 RX ORDER — AMLODIPINE BESYLATE 10 MG/1
TABLET ORAL
Qty: 90 TABLET | Refills: 1 | Status: SHIPPED | OUTPATIENT
Start: 2022-04-18 | End: 2022-11-01 | Stop reason: SDUPTHER

## 2022-04-18 RX ORDER — LEVOTHYROXINE SODIUM 0.07 MG/1
TABLET ORAL
Qty: 90 TABLET | Refills: 1 | Status: SHIPPED | OUTPATIENT
Start: 2022-04-18 | End: 2022-10-28 | Stop reason: SDUPTHER

## 2022-04-19 DIAGNOSIS — E03.8 HYPOTHYROIDISM DUE TO HASHIMOTO'S THYROIDITIS: Primary | ICD-10-CM

## 2022-04-19 DIAGNOSIS — E55.9 VITAMIN D DEFICIENCY: ICD-10-CM

## 2022-04-19 DIAGNOSIS — E11.9 TYPE 2 DIABETES MELLITUS WITHOUT COMPLICATION, UNSPECIFIED WHETHER LONG TERM INSULIN USE: ICD-10-CM

## 2022-04-19 DIAGNOSIS — E06.3 HYPOTHYROIDISM DUE TO HASHIMOTO'S THYROIDITIS: Primary | ICD-10-CM

## 2022-04-20 LAB
25(OH)D3+25(OH)D2 SERPL-MCNC: 34.3 NG/ML (ref 30–100)
ALBUMIN SERPL-MCNC: 3.7 G/DL (ref 3.7–4.7)
ALBUMIN/CREAT UR: 3 MG/G CREAT (ref 0–29)
ALBUMIN/GLOB SERPL: 1.9 {RATIO} (ref 1.2–2.2)
ALP SERPL-CCNC: 64 IU/L (ref 44–121)
ALT SERPL-CCNC: 17 IU/L (ref 0–44)
AST SERPL-CCNC: 15 IU/L (ref 0–40)
BILIRUB SERPL-MCNC: 0.5 MG/DL (ref 0–1.2)
BUN SERPL-MCNC: 13 MG/DL (ref 8–27)
BUN/CREAT SERPL: 11 (ref 10–24)
CALCIUM SERPL-MCNC: 8.7 MG/DL (ref 8.6–10.2)
CHLORIDE SERPL-SCNC: 105 MMOL/L (ref 96–106)
CHOLEST SERPL-MCNC: 158 MG/DL (ref 100–199)
CO2 SERPL-SCNC: 23 MMOL/L (ref 20–29)
CREAT SERPL-MCNC: 1.14 MG/DL (ref 0.76–1.27)
CREAT UR-MCNC: 118.6 MG/DL
EGFRCR SERPLBLD CKD-EPI 2021: 69 ML/MIN/1.73
GLOBULIN SER CALC-MCNC: 1.9 G/DL (ref 1.5–4.5)
GLUCOSE SERPL-MCNC: 125 MG/DL (ref 65–99)
HBA1C MFR BLD: 6.7 % (ref 4.8–5.6)
HDLC SERPL-MCNC: 50 MG/DL
IMP & REVIEW OF LAB RESULTS: NORMAL
LDLC SERPL CALC-MCNC: 98 MG/DL (ref 0–99)
MICROALBUMIN UR-MCNC: 3.3 UG/ML
POTASSIUM SERPL-SCNC: 4.3 MMOL/L (ref 3.5–5.2)
PROT SERPL-MCNC: 5.6 G/DL (ref 6–8.5)
SODIUM SERPL-SCNC: 141 MMOL/L (ref 134–144)
T4 FREE SERPL-MCNC: 1.31 NG/DL (ref 0.82–1.77)
TRIGL SERPL-MCNC: 48 MG/DL (ref 0–149)
TSH SERPL DL<=0.005 MIU/L-ACNC: 4.37 UIU/ML (ref 0.45–4.5)
VLDLC SERPL CALC-MCNC: 10 MG/DL (ref 5–40)

## 2022-05-03 ENCOUNTER — OFFICE VISIT (OUTPATIENT)
Dept: ENDOCRINOLOGY | Age: 72
End: 2022-05-03

## 2022-05-03 VITALS
OXYGEN SATURATION: 99 % | SYSTOLIC BLOOD PRESSURE: 123 MMHG | BODY MASS INDEX: 31.99 KG/M2 | WEIGHT: 241.4 LBS | HEIGHT: 73 IN | DIASTOLIC BLOOD PRESSURE: 65 MMHG | HEART RATE: 73 BPM

## 2022-05-03 DIAGNOSIS — I10 PRIMARY HYPERTENSION: ICD-10-CM

## 2022-05-03 DIAGNOSIS — E11.8 CONTROLLED TYPE 2 DIABETES MELLITUS WITH COMPLICATION, WITHOUT LONG-TERM CURRENT USE OF INSULIN: Primary | ICD-10-CM

## 2022-05-03 DIAGNOSIS — E55.9 VITAMIN D DEFICIENCY: ICD-10-CM

## 2022-05-03 DIAGNOSIS — E06.3 HYPOTHYROIDISM DUE TO HASHIMOTO'S THYROIDITIS: ICD-10-CM

## 2022-05-03 DIAGNOSIS — E03.8 HYPOTHYROIDISM DUE TO HASHIMOTO'S THYROIDITIS: ICD-10-CM

## 2022-05-03 DIAGNOSIS — E78.2 MIXED HYPERLIPIDEMIA: ICD-10-CM

## 2022-05-03 PROCEDURE — 99214 OFFICE O/P EST MOD 30 MIN: CPT | Performed by: NURSE PRACTITIONER

## 2022-05-03 NOTE — PROGRESS NOTES
Chief Complaint  Chief Complaint   Patient presents with   • Diabetes     Type 2        Subjective          History of Present Illness    Wilver Segura Sr. 71 y.o. presents for a follow-up evaluation for type 2 DM    He has been diabetic since 1980s    Grandparents children and siblings have type 2 diabetes.  His brother in 2021 had bilateral leg amputations and is now in a nursing home.    Pt is on the following medications for their DM: Pioglitazone 30 mg daily, metformin 750 mg with breakfast, Glipizide XL 10 mg daily and Januvia 100 mg daily.      Denies diarrhea, constipation, chest pain, shortness of breath, vision changes, numbness and tingling in feet/hands.    Weight loss of 2 lbs since last visit.    Pt does/not have a history of DM retinopathy.  Last eye exam was 08/21  Cataracts resected 8/20/2021 and 10/20/2019    Pt does not have a history of nephropathy.  Patient is currently taking ARB    Pt does not have neuropathy.    Pt does not have a history of CAD or CVA.    Last A1C in 04/21 was 6.7    Last microalbumin in 04/22 was negative          Blood Sugars    Blood glucoses are checked couple times a week.    Fasting blood glucoses: 54-86    Pre-meal blood glucoses: 92- 200s    Pt has 3 episodes of hypoglycemia in Feb in the morning, but have resolved.          Hypothyroid    Diagnosed about 4-5 years ago through lab work    Denies fatigue, weight changes, constipation, diarrhea, hair loss, dry skin, chest pain, palpitations, heat intolerance or cold intolerance.    Current treatment is levothyroxine 75 mcg daily    Last labs in 04/22 showed TSH 4.370 and FT4 1.31            Hyperlipidemia     Pt denies any muscle/body aches, chest pain, or shortness of breath    Pt is currently taking atorvastatin 40 mg HS    Last lipid panel in 04/22 showed Total 158, Triglycerides 48, HDL 50 and LDL 98            Hypertension    Pt denies any chest pain, palpitations, shortness of breath, or headache    Current  "regimen includes carvedilol CR 80 mg daily, amlodipine 10 mg daily and azilsartan 80 mg daily          Vitamin D Deficiency    Current treatment includes 3,000 units daily    Last Vit D level was 34.3 in 04/22          I have reviewed the patient's allergies, medicines, past medical hx, family hx and social hx.    Objective   Vital Signs:   /65   Pulse 73   Ht 185.4 cm (73\")   Wt 109 kg (241 lb 6.4 oz)   SpO2 99%   BMI 31.85 kg/m²       Physical Exam   Physical Exam  Constitutional:       General: He is not in acute distress.     Appearance: Normal appearance. He is not diaphoretic.   HENT:      Head: Normocephalic and atraumatic.   Eyes:      General:         Right eye: No discharge.         Left eye: No discharge.   Cardiovascular:      Rate and Rhythm: Normal rate and regular rhythm.      Heart sounds: Normal heart sounds. No murmur heard.    No friction rub. No gallop.   Pulmonary:      Effort: Pulmonary effort is normal. No respiratory distress.      Breath sounds: Normal breath sounds. No wheezing.   Skin:     General: Skin is warm and dry.   Neurological:      Mental Status: He is alert.   Psychiatric:         Mood and Affect: Mood normal.         Behavior: Behavior normal.                    Results Review:   Hemoglobin A1C   Date Value Ref Range Status   04/19/2022 6.7 (H) 4.8 - 5.6 % Final     Comment:              Prediabetes: 5.7 - 6.4           Diabetes: >6.4           Glycemic control for adults with diabetes: <7.0     01/20/2020 6.3 (H) 4.3 - 5.6 % Final     Comment:     (note)  A1C% Reference Range:  4.3 - 5.6  Normal range  5.7 - 6.4  Pre-diabetic -increased risk for developing diabetes mellitus.  >=6.5      Diabetic -diagnostic of diabetes mellitus.     Note: For diagnosis of diabetes in individuals without unequivocal   hyperglycemia, results should be confirmed by repeat testing.  Patients with conditions that shorten erythrocyte survival, such as  recovery from acute blood loss, " hemolytic anemia, kidney disease,  or the presence of unstable hemoglobins like HbSS, HbCC, and HbSC  may yield falsely decreased HbA1c test results. Iron deficiency may  yield falsely increased HbA1c test results.     Triglycerides   Date Value Ref Range Status   04/19/2022 48 0 - 149 mg/dL Final   01/20/2020 47 0 - 149 mg/dL Final     HDL Cholesterol   Date Value Ref Range Status   04/19/2022 50 >39 mg/dL Final   01/20/2020 44 >39 mg/dL Final     LDL Cholesterol    Date Value Ref Range Status   01/20/2020 92 0 - 100 mg/dL Final     LDL Chol Calc ([facility])   Date Value Ref Range Status   04/19/2022 98 0 - 99 mg/dL Final     VLDL Cholesterol   Date Value Ref Range Status   01/20/2020 9 5 - 40 mg/dL Final     VLDL Cholesterol Jack   Date Value Ref Range Status   04/19/2022 10 5 - 40 mg/dL Final         Assessment and Plan {CC Problem List  Visit Diagnosis  ROS  Review (Popup)  Health Maintenance  Quality  BestPractice  Medications  SmartSets  SnapShot Encounters  Media :23  Diagnoses and all orders for this visit:    1. Controlled type 2 diabetes mellitus with complication, without long-term current use of insulin (HCC) (Primary)  -     Hemoglobin A1c; Future  -     Comprehensive Metabolic Panel; Future  -     Lipid Panel; Future    Continue with Pioglitazone 30 mg daily, metformin 750 mg with breakfast, Glipizide XL 10 mg daily and Januvia 100 mg daily.  Continue with BG checks  Check labs prior to next visit      2. Hypothyroidism due to Hashimoto's thyroiditis  -     TSH; Future  -     T4, Free; Future    Thyroid labs are good  Continue with  levothyroxine 75 mcg daily  Check labs prior to next visit      3. Mixed hyperlipidemia  -     Comprehensive Metabolic Panel; Future  -     Lipid Panel; Future    Lipid panel is good  Continue with atorvastatin 40 mg HS  Check labs prior to next visit         4. Primary hypertension  -     Comprehensive Metabolic Panel; Future    Stable  Continue with current  medication regimen  Defer management to PCP      5. Vitamin D deficiency  -     Vitamin D 25 Hydroxy; Future       Vitamin D levels good  Continue with supplement        No refills needed at this time        Labs today  RTC in 6 months with me      Follow Up     Patient was given instructions and counseling regarding her condition or for health maintenance advice. Please see specific information pulled into the AVS if appropriate.              Patti Green, MAURI  05/03/22

## 2022-09-19 ENCOUNTER — TELEPHONE (OUTPATIENT)
Dept: ENDOCRINOLOGY | Age: 72
End: 2022-09-19

## 2022-09-19 DIAGNOSIS — E11.8 CONTROLLED TYPE 2 DIABETES MELLITUS WITH COMPLICATION, WITHOUT LONG-TERM CURRENT USE OF INSULIN: ICD-10-CM

## 2022-09-19 RX ORDER — PIOGLITAZONEHYDROCHLORIDE 30 MG/1
30 TABLET ORAL DAILY
Qty: 90 TABLET | Refills: 0 | Status: SHIPPED | OUTPATIENT
Start: 2022-09-19 | End: 2022-09-21 | Stop reason: SDUPTHER

## 2022-09-19 NOTE — TELEPHONE ENCOUNTER
PT CALLED IN WANTING A REFILL OF   pioglitazone (Actos) 30 MG tablet [[id number]]    SENT TO [facility] [place]  [address], [place] [address] [phone]    PT HAS A FEW LEFT.

## 2022-09-20 ENCOUNTER — TELEPHONE (OUTPATIENT)
Dept: ENDOCRINOLOGY | Age: 72
End: 2022-09-20

## 2022-09-20 DIAGNOSIS — E11.8 CONTROLLED TYPE 2 DIABETES MELLITUS WITH COMPLICATION, WITHOUT LONG-TERM CURRENT USE OF INSULIN: ICD-10-CM

## 2022-09-20 RX ORDER — METFORMIN HYDROCHLORIDE 750 MG/1
750 TABLET, EXTENDED RELEASE ORAL
Qty: 90 TABLET | Refills: 1 | Status: SHIPPED | OUTPATIENT
Start: 2022-09-20 | End: 2022-09-21 | Stop reason: SDUPTHER

## 2022-09-21 ENCOUNTER — TELEPHONE (OUTPATIENT)
Dept: ENDOCRINOLOGY | Age: 72
End: 2022-09-21

## 2022-09-21 DIAGNOSIS — E11.8 CONTROLLED TYPE 2 DIABETES MELLITUS WITH COMPLICATION, WITHOUT LONG-TERM CURRENT USE OF INSULIN: ICD-10-CM

## 2022-09-21 RX ORDER — METFORMIN HYDROCHLORIDE 750 MG/1
750 TABLET, EXTENDED RELEASE ORAL
Qty: 90 TABLET | Refills: 1 | Status: SHIPPED | OUTPATIENT
Start: 2022-09-21 | End: 2022-09-22 | Stop reason: SDUPTHER

## 2022-09-21 RX ORDER — PIOGLITAZONEHYDROCHLORIDE 30 MG/1
30 TABLET ORAL DAILY
Qty: 90 TABLET | Refills: 1 | Status: SHIPPED | OUTPATIENT
Start: 2022-09-21 | End: 2022-09-22 | Stop reason: SDUPTHER

## 2022-09-22 DIAGNOSIS — E11.8 CONTROLLED TYPE 2 DIABETES MELLITUS WITH COMPLICATION, WITHOUT LONG-TERM CURRENT USE OF INSULIN: ICD-10-CM

## 2022-09-22 RX ORDER — METFORMIN HYDROCHLORIDE 750 MG/1
750 TABLET, EXTENDED RELEASE ORAL
Qty: 90 TABLET | Refills: 1 | Status: SHIPPED | OUTPATIENT
Start: 2022-09-22 | End: 2022-09-28 | Stop reason: ALTCHOICE

## 2022-09-22 RX ORDER — PIOGLITAZONEHYDROCHLORIDE 30 MG/1
30 TABLET ORAL DAILY
Qty: 90 TABLET | Refills: 1 | Status: SHIPPED | OUTPATIENT
Start: 2022-09-22 | End: 2022-09-28 | Stop reason: ALTCHOICE

## 2022-09-26 DIAGNOSIS — E11.8 CONTROLLED TYPE 2 DIABETES MELLITUS WITH COMPLICATION, WITHOUT LONG-TERM CURRENT USE OF INSULIN: ICD-10-CM

## 2022-09-26 NOTE — TELEPHONE ENCOUNTER
PT CAME IN ASKING TO GO BACK ON PIOGLITIZONE, HE DOES NOT LIKE THE OTHER ONE AND RATHER PAY FOR GOOD MEDICATION.     PLEASE SEND TO  [facility] ON [address]

## 2022-09-27 RX ORDER — PIOGLITAZONEHYDROCHLORIDE 30 MG/1
30 TABLET ORAL DAILY
Qty: 90 TABLET | Refills: 1 | OUTPATIENT
Start: 2022-09-27 | End: 2023-09-27

## 2022-09-27 NOTE — TELEPHONE ENCOUNTER
Pt does not like how Metformin is going for him. He is asking if he could go back to Pioglitazone    Requested Prescriptions     Pending Prescriptions Disp Refills   • pioglitazone (Actos) 30 MG tablet 90 tablet 1     Sig: Take 1 tablet by mouth Daily.

## 2022-09-28 ENCOUNTER — TREATMENT (OUTPATIENT)
Dept: ENDOCRINOLOGY | Age: 72
End: 2022-09-28

## 2022-09-28 ENCOUNTER — TELEPHONE (OUTPATIENT)
Dept: ENDOCRINOLOGY | Age: 72
End: 2022-09-28

## 2022-09-28 RX ORDER — PIOGLITAZONE HCL AND METFORMIN HCL 850; 15 MG/1; MG/1
1 TABLET ORAL 2 TIMES DAILY WITH MEALS
Qty: 60 TABLET | Refills: 1 | Status: SHIPPED | OUTPATIENT
Start: 2022-09-28 | End: 2022-11-07

## 2022-09-28 RX ORDER — PIOGLITAZONE HCL AND METFORMIN HCL 850; 15 MG/1; MG/1
1 TABLET ORAL 2 TIMES DAILY WITH MEALS
Status: CANCELLED | OUTPATIENT
Start: 2022-09-28

## 2022-09-28 NOTE — TELEPHONE ENCOUNTER
Called and let patient know that the medication was sent to the pharmacy and instructions on his Glipizide .

## 2022-09-28 NOTE — TELEPHONE ENCOUNTER
PT CALLED WANTING HIS OLD MEDICATION REFILLED. PT NEEDS IT FOR HIS BLOOD SUGAR. PT WOULD LIKE A CALL BACK TODAY IF POSSIBLE.PLEASE CALL HIM BACK -[phone]

## 2022-09-28 NOTE — PROGRESS NOTES
Patient was previously on pioglitazone-metformin  3 tablets daily, but was changed to pioglitazone 30 mg daily and metformin  mg daily due to insurance no longer covering medication.  Pt called and stated that his blood sugars were doing better on combination and wants to go back onto it and he will pay for the medication.  Change to pioglitazone-metformin  2 tablets daily.  Prescription sent to pharmacy.  If he starts to have low blood sugars then he needs to decrease his glipizide 10 mg to 1 tablet daily and he is to let me know.

## 2022-09-28 NOTE — TELEPHONE ENCOUNTER
Pt is wanting to switch back to his previous medication. He states that when he was on the medication below, his blood sugars were fine and there was no problems. The metformin that he is on now, he states that his blood sugars would be high.      Patient said that if we could refill this medication before the weekend, because he will be out of town.       Requested Prescriptions     Pending Prescriptions Disp Refills   • pioglitazone-metFORMIN (ACTOPLUS MET)  MG per tablet       Sig: Take 1 tablet by mouth 3 (Three) Times a Day.

## 2022-10-05 ENCOUNTER — TELEPHONE (OUTPATIENT)
Dept: ENDOCRINOLOGY | Age: 72
End: 2022-10-05

## 2022-10-24 ENCOUNTER — LAB (OUTPATIENT)
Dept: ENDOCRINOLOGY | Age: 72
End: 2022-10-24

## 2022-10-24 DIAGNOSIS — I10 PRIMARY HYPERTENSION: ICD-10-CM

## 2022-10-24 DIAGNOSIS — E55.9 VITAMIN D DEFICIENCY: ICD-10-CM

## 2022-10-24 DIAGNOSIS — E78.2 MIXED HYPERLIPIDEMIA: ICD-10-CM

## 2022-10-24 DIAGNOSIS — E11.8 CONTROLLED TYPE 2 DIABETES MELLITUS WITH COMPLICATION, WITHOUT LONG-TERM CURRENT USE OF INSULIN: ICD-10-CM

## 2022-10-24 DIAGNOSIS — E03.8 HYPOTHYROIDISM DUE TO HASHIMOTO'S THYROIDITIS: ICD-10-CM

## 2022-10-24 DIAGNOSIS — E06.3 HYPOTHYROIDISM DUE TO HASHIMOTO'S THYROIDITIS: ICD-10-CM

## 2022-10-24 LAB
25(OH)D3+25(OH)D2 SERPL-MCNC: 39.4 NG/ML (ref 30–100)
ALBUMIN SERPL-MCNC: 3.8 G/DL (ref 3.5–5.2)
ALBUMIN/GLOB SERPL: 2.1 G/DL
ALP SERPL-CCNC: 59 U/L (ref 39–117)
ALT SERPL-CCNC: 12 U/L (ref 1–41)
AST SERPL-CCNC: 13 U/L (ref 1–40)
BILIRUB SERPL-MCNC: 0.5 MG/DL (ref 0–1.2)
BUN SERPL-MCNC: 17 MG/DL (ref 8–23)
BUN/CREAT SERPL: 16.2 (ref 7–25)
CALCIUM SERPL-MCNC: 9.1 MG/DL (ref 8.6–10.5)
CHLORIDE SERPL-SCNC: 106 MMOL/L (ref 98–107)
CHOLEST SERPL-MCNC: 151 MG/DL (ref 0–200)
CO2 SERPL-SCNC: 27 MMOL/L (ref 22–29)
CREAT SERPL-MCNC: 1.05 MG/DL (ref 0.76–1.27)
EGFRCR SERPLBLD CKD-EPI 2021: 75.9 ML/MIN/1.73
GLOBULIN SER CALC-MCNC: 1.8 GM/DL
GLUCOSE SERPL-MCNC: 97 MG/DL (ref 65–99)
HBA1C MFR BLD: 6.7 % (ref 4.8–5.6)
HDLC SERPL-MCNC: 52 MG/DL (ref 40–60)
IMP & REVIEW OF LAB RESULTS: NORMAL
LDLC SERPL CALC-MCNC: 90 MG/DL (ref 0–100)
POTASSIUM SERPL-SCNC: 4.4 MMOL/L (ref 3.5–5.2)
PROT SERPL-MCNC: 5.6 G/DL (ref 6–8.5)
SODIUM SERPL-SCNC: 142 MMOL/L (ref 136–145)
T4 FREE SERPL-MCNC: 1.12 NG/DL (ref 0.93–1.7)
TRIGL SERPL-MCNC: 40 MG/DL (ref 0–150)
TSH SERPL DL<=0.005 MIU/L-ACNC: 3.21 UIU/ML (ref 0.27–4.2)
VLDLC SERPL CALC-MCNC: 9 MG/DL (ref 5–40)

## 2022-10-28 DIAGNOSIS — I10 PRIMARY HYPERTENSION: ICD-10-CM

## 2022-10-28 DIAGNOSIS — E03.8 HYPOTHYROIDISM DUE TO HASHIMOTO'S THYROIDITIS: ICD-10-CM

## 2022-10-28 DIAGNOSIS — E06.3 HYPOTHYROIDISM DUE TO HASHIMOTO'S THYROIDITIS: ICD-10-CM

## 2022-10-28 DIAGNOSIS — E11.65 UNCONTROLLED TYPE 2 DIABETES MELLITUS WITH HYPERGLYCEMIA: ICD-10-CM

## 2022-10-28 DIAGNOSIS — E78.2 MIXED HYPERLIPIDEMIA: ICD-10-CM

## 2022-10-28 RX ORDER — LEVOTHYROXINE SODIUM 0.07 MG/1
75 TABLET ORAL DAILY
Qty: 90 TABLET | Refills: 1 | Status: SHIPPED | OUTPATIENT
Start: 2022-10-28

## 2022-11-01 ENCOUNTER — TELEPHONE (OUTPATIENT)
Dept: ENDOCRINOLOGY | Age: 72
End: 2022-11-01

## 2022-11-01 DIAGNOSIS — I10 PRIMARY HYPERTENSION: ICD-10-CM

## 2022-11-01 DIAGNOSIS — E78.2 MIXED HYPERLIPIDEMIA: ICD-10-CM

## 2022-11-01 DIAGNOSIS — E03.8 HYPOTHYROIDISM DUE TO HASHIMOTO'S THYROIDITIS: ICD-10-CM

## 2022-11-01 DIAGNOSIS — E11.65 UNCONTROLLED TYPE 2 DIABETES MELLITUS WITH HYPERGLYCEMIA: ICD-10-CM

## 2022-11-01 DIAGNOSIS — E06.3 HYPOTHYROIDISM DUE TO HASHIMOTO'S THYROIDITIS: ICD-10-CM

## 2022-11-01 RX ORDER — AMLODIPINE BESYLATE 10 MG/1
10 TABLET ORAL DAILY
Qty: 90 TABLET | Refills: 1 | Status: SHIPPED | OUTPATIENT
Start: 2022-11-01 | End: 2022-11-01 | Stop reason: SDUPTHER

## 2022-11-01 RX ORDER — ATORVASTATIN CALCIUM 40 MG/1
40 TABLET, FILM COATED ORAL DAILY
Qty: 90 TABLET | Refills: 1 | Status: SHIPPED | OUTPATIENT
Start: 2022-11-01

## 2022-11-01 RX ORDER — SITAGLIPTIN 100 MG/1
100 TABLET, FILM COATED ORAL DAILY
Qty: 90 TABLET | Refills: 1 | Status: SHIPPED | OUTPATIENT
Start: 2022-11-01

## 2022-11-01 RX ORDER — GLIPIZIDE 10 MG/1
10 TABLET, FILM COATED, EXTENDED RELEASE ORAL 2 TIMES DAILY
Qty: 180 TABLET | Refills: 1 | Status: SHIPPED | OUTPATIENT
Start: 2022-11-01 | End: 2022-11-01 | Stop reason: SDUPTHER

## 2022-11-01 RX ORDER — ATORVASTATIN CALCIUM 40 MG/1
40 TABLET, FILM COATED ORAL DAILY
Qty: 90 TABLET | Refills: 1 | Status: SHIPPED | OUTPATIENT
Start: 2022-11-01 | End: 2022-11-01 | Stop reason: SDUPTHER

## 2022-11-01 RX ORDER — AMLODIPINE BESYLATE 10 MG/1
10 TABLET ORAL DAILY
Qty: 90 TABLET | Refills: 1 | Status: SHIPPED | OUTPATIENT
Start: 2022-11-01

## 2022-11-01 RX ORDER — SITAGLIPTIN 100 MG/1
100 TABLET, FILM COATED ORAL DAILY
Qty: 90 TABLET | Refills: 1 | Status: SHIPPED | OUTPATIENT
Start: 2022-11-01 | End: 2022-11-01 | Stop reason: SDUPTHER

## 2022-11-01 RX ORDER — GLIPIZIDE 10 MG/1
10 TABLET, FILM COATED, EXTENDED RELEASE ORAL 2 TIMES DAILY
Qty: 180 TABLET | Refills: 1 | Status: SHIPPED | OUTPATIENT
Start: 2022-11-01

## 2022-11-01 NOTE — TELEPHONE ENCOUNTER
PT CALLED NEEDING 4 REFILLS SENT IN FOR JANUVIA, GLIPIZIDE, ATORVASTATIN, AND HIS AMLODIPINE    [facility]

## 2022-11-07 ENCOUNTER — OFFICE VISIT (OUTPATIENT)
Dept: ENDOCRINOLOGY | Age: 72
End: 2022-11-07

## 2022-11-07 VITALS
BODY MASS INDEX: 32.84 KG/M2 | DIASTOLIC BLOOD PRESSURE: 72 MMHG | SYSTOLIC BLOOD PRESSURE: 110 MMHG | HEART RATE: 72 BPM | OXYGEN SATURATION: 96 % | TEMPERATURE: 97.7 F | WEIGHT: 247.8 LBS | HEIGHT: 73 IN

## 2022-11-07 DIAGNOSIS — E11.8 CONTROLLED TYPE 2 DIABETES MELLITUS WITH COMPLICATION, WITHOUT LONG-TERM CURRENT USE OF INSULIN: Primary | ICD-10-CM

## 2022-11-07 DIAGNOSIS — E78.2 MIXED HYPERLIPIDEMIA: ICD-10-CM

## 2022-11-07 DIAGNOSIS — E03.8 HYPOTHYROIDISM DUE TO HASHIMOTO'S THYROIDITIS: ICD-10-CM

## 2022-11-07 DIAGNOSIS — E06.3 HYPOTHYROIDISM DUE TO HASHIMOTO'S THYROIDITIS: ICD-10-CM

## 2022-11-07 DIAGNOSIS — E55.9 VITAMIN D DEFICIENCY: ICD-10-CM

## 2022-11-07 DIAGNOSIS — I10 PRIMARY HYPERTENSION: ICD-10-CM

## 2022-11-07 PROCEDURE — 99214 OFFICE O/P EST MOD 30 MIN: CPT | Performed by: NURSE PRACTITIONER

## 2022-11-07 RX ORDER — PIOGLITAZONE HCL AND METFORMIN HCL 850; 15 MG/1; MG/1
TABLET ORAL
Qty: 90 TABLET | Refills: 2 | Status: SHIPPED | OUTPATIENT
Start: 2022-11-07 | End: 2023-01-30

## 2022-11-07 NOTE — PROGRESS NOTES
Chief Complaint  Chief Complaint   Patient presents with   • Diabetes     Type 2: Pt doesn't have meter, does have readings, is up to date on eye exam, no hx of retinopathy or neuropathy.        Subjective          History of Present Illness    Wilver Segura Sr. 72 y.o. presents for a follow-up evaluation for type 2 DM     He has been diabetic since 1980s     Grandparents children and siblings have type 2 diabetes.  His brother in 2021 had bilateral leg amputations and is now in a nursing home.     Pt is on the following medications for their DM:  pioglitazone-metformin  1 tablet in morning and 2 tablets at night, Glipizide XL 10 mg twice a day and Januvia 100 mg daily.      Pt complains of constipation, diarrhea, chest pain, shortness of breath, numbness and tingling in feet/hands and vision changes.    Denies diarrhea, constipation, chest pain, shortness of breath, vision changes or numbness and tingling in feet/hands.    Weight gain of 6 lbs since last visit.    Pt does not have a history of DM retinopathy.  Last eye exam was 10/22  Cataracts resected 8/20/2021 and 10/20/2019     Pt does not have a history of nephropathy.  Patient is currently taking ARB     Pt does not have neuropathy.     Pt does not have a history of CAD or CVA.    Last A1C in 10/22 was 6.7    Last microalbumin in 04/22 was negative        Blood Sugars    Blood glucoses are checked a couple times a week.    Fasting blood glucoses:     Pt has rare episodes of hypoglycemia.            Hypothyroid    Diagnosed about 4-5 years ago through lab work    Denies constipation, diarrhea, hair loss, dry skin, chest pain, palpitations, heat intolerance or cold intolerance.    Current treatment is levothyroxine 75 mcg daily    Last labs in 10/22 showed TSH 3.210 and FT4 1.12            Hyperlipidemia     Pt denies any muscle/body aches, chest pain, or shortness of breath    Pt is currently taking atorvastatin 40 mg HS    Last lipid panel in  "10/22 showed Total 151, Triglycerides 40, HDL 52 and LDL 90            Hypertension    Pt denies any chest pain, palpitations, shortness of breath, or headache    Current regimen includes carvedilol CR 80 mg daily, amlodipine 10 mg daily and azilsartan 80 mg daily          Vitamin D Deficiency    Current treatment includes 3,000 units daily    Last Vit D level was 39.4 in 10/22       I have reviewed the patient's allergies, medicines, past medical hx, family hx and social hx.    Objective   Vital Signs:   /72   Pulse 72   Temp 97.7 °F (36.5 °C) (Temporal)   Ht 185.4 cm (72.99\")   Wt 112 kg (247 lb 12.8 oz)   SpO2 96%   BMI 32.70 kg/m²       Physical Exam   Physical Exam  Constitutional:       General: He is not in acute distress.     Appearance: Normal appearance. He is not diaphoretic.   HENT:      Head: Normocephalic and atraumatic.   Eyes:      General:         Right eye: No discharge.         Left eye: No discharge.   Skin:     General: Skin is warm and dry.   Neurological:      Mental Status: He is alert.   Psychiatric:         Mood and Affect: Mood normal.         Behavior: Behavior normal.                    Results Review:   Hemoglobin A1C   Date Value Ref Range Status   10/24/2022 6.70 (H) 4.80 - 5.60 % Final     Comment:     Hemoglobin A1C Ranges:  Increased Risk for Diabetes  5.7% to 6.4%  Diabetes                     >= 6.5%  Diabetic Goal                < 7.0%     01/20/2020 6.3 (H) 4.3 - 5.6 % Final     Comment:     (note)  A1C% Reference Range:  4.3 - 5.6  Normal range  5.7 - 6.4  Pre-diabetic -increased risk for developing diabetes mellitus.  >=6.5      Diabetic -diagnostic of diabetes mellitus.     Note: For diagnosis of diabetes in individuals without unequivocal   hyperglycemia, results should be confirmed by repeat testing.  Patients with conditions that shorten erythrocyte survival, such as  recovery from acute blood loss, hemolytic anemia, kidney disease,  or the presence of " unstable hemoglobins like HbSS, HbCC, and HbSC  may yield falsely decreased HbA1c test results. Iron deficiency may  yield falsely increased HbA1c test results.     Triglycerides   Date Value Ref Range Status   10/24/2022 40 0 - 150 mg/dL Final   01/20/2020 47 0 - 149 mg/dL Final     HDL Cholesterol   Date Value Ref Range Status   10/24/2022 52 40 - 60 mg/dL Final   01/20/2020 44 >39 mg/dL Final     LDL Cholesterol    Date Value Ref Range Status   01/20/2020 92 0 - 100 mg/dL Final     LDL Chol Calc (NIH)   Date Value Ref Range Status   10/24/2022 90 0 - 100 mg/dL Final     VLDL Cholesterol   Date Value Ref Range Status   01/20/2020 9 5 - 40 mg/dL Final     VLDL Cholesterol Jack   Date Value Ref Range Status   10/24/2022 9 5 - 40 mg/dL Final         Assessment and Plan {CC Problem List  Visit Diagnosis  ROS  Review (Popup)  Health Maintenance  Quality  BestPractice  Medications  SmartSets  SnapShot Encounters  Media :23  Diagnoses and all orders for this visit:    1. Controlled type 2 diabetes mellitus with complication, without long-term current use of insulin (HCC) (Primary)  -     pioglitazone-metFORMIN (ACTOPLUS MET)  MG per tablet; 2 tablets in the morning with breakfast and 1 tablet with dinner  Dispense: 90 tablet; Refill: 2  -     Hemoglobin A1c; Future  -     Comprehensive Metabolic Panel; Future  -     Microalbumin / Creatinine Urine Ratio - Urine, Clean Catch; Future    A1C is good at 6.7% with rare lows.  Change pioglitazone-metformin  2 tablet in morning and 1 tablets at night  Continue with Glipizide XL 10 mg twice a day and Januvia 100 mg daily  Continue with BG checks  Check labs prior to next visit        2. Hypothyroidism due to Hashimoto's thyroiditis  -     TSH; Future  -     T4, Free; Future    Thyroid labs are good  Continue with levothyroxine 75 mcg daily      3. Mixed hyperlipidemia  -     Comprehensive Metabolic Panel; Future  -     Lipid Panel; Future    Lipid panel is  good  Continue with statin      4. Primary hypertension  -     Comprehensive Metabolic Panel; Future      Stable  Continue with current medication regimen  Defer management to PCP      5. Vitamin D deficiency  -     Vitamin D,25-Hydroxy; Future     Vitamin D levels are good  Continue with supplement        Refills sent to pharmacy      RTC in 6 months with me, labs prior      Follow Up     Patient was given instructions and counseling regarding her condition or for health maintenance advice. Please see specific information pulled into the AVS if appropriate.              Patti Green, MAURI  11/07/22

## 2022-11-07 NOTE — PATIENT INSTRUCTIONS
Change pioglitazone-metformin  2 tablet in morning and 1 tablets at night  Continue with Glipizide XL 10 mg twice a day and Januvia 100 mg daily

## 2023-01-30 DIAGNOSIS — E11.8 CONTROLLED TYPE 2 DIABETES MELLITUS WITH COMPLICATION, WITHOUT LONG-TERM CURRENT USE OF INSULIN: ICD-10-CM

## 2023-01-30 RX ORDER — PIOGLITAZONE HCL AND METFORMIN HCL 850; 15 MG/1; MG/1
TABLET ORAL
Qty: 270 TABLET | Refills: 1 | Status: SHIPPED | OUTPATIENT
Start: 2023-01-30

## 2023-04-26 DIAGNOSIS — E11.8 CONTROLLED TYPE 2 DIABETES MELLITUS WITH COMPLICATION, WITHOUT LONG-TERM CURRENT USE OF INSULIN: ICD-10-CM

## 2023-04-26 DIAGNOSIS — I10 PRIMARY HYPERTENSION: ICD-10-CM

## 2023-04-26 DIAGNOSIS — E55.9 VITAMIN D DEFICIENCY: ICD-10-CM

## 2023-04-26 DIAGNOSIS — E78.2 MIXED HYPERLIPIDEMIA: ICD-10-CM

## 2023-04-26 DIAGNOSIS — E06.3 HYPOTHYROIDISM DUE TO HASHIMOTO'S THYROIDITIS: ICD-10-CM

## 2023-04-26 DIAGNOSIS — E03.8 HYPOTHYROIDISM DUE TO HASHIMOTO'S THYROIDITIS: ICD-10-CM

## 2023-04-27 LAB
25(OH)D3+25(OH)D2 SERPL-MCNC: 35.4 NG/ML (ref 30–100)
ALBUMIN SERPL-MCNC: 3.8 G/DL (ref 3.5–5.2)
ALBUMIN/GLOB SERPL: 2.1 G/DL
ALP SERPL-CCNC: 60 U/L (ref 39–117)
ALT SERPL-CCNC: 14 U/L (ref 1–41)
AST SERPL-CCNC: 16 U/L (ref 1–40)
BILIRUB SERPL-MCNC: 0.4 MG/DL (ref 0–1.2)
BUN SERPL-MCNC: 13 MG/DL (ref 8–23)
BUN/CREAT SERPL: 10.6 (ref 7–25)
CALCIUM SERPL-MCNC: 9.3 MG/DL (ref 8.6–10.5)
CHLORIDE SERPL-SCNC: 110 MMOL/L (ref 98–107)
CHOLEST SERPL-MCNC: 130 MG/DL (ref 0–200)
CO2 SERPL-SCNC: 27.5 MMOL/L (ref 22–29)
CREAT SERPL-MCNC: 1.23 MG/DL (ref 0.76–1.27)
EGFRCR SERPLBLD CKD-EPI 2021: 62.4 ML/MIN/1.73
GLOBULIN SER CALC-MCNC: 1.8 GM/DL
GLUCOSE SERPL-MCNC: 97 MG/DL (ref 65–99)
HBA1C MFR BLD: 6.4 % (ref 4.8–5.6)
HDLC SERPL-MCNC: 44 MG/DL (ref 40–60)
IMP & REVIEW OF LAB RESULTS: NORMAL
LDLC SERPL CALC-MCNC: 77 MG/DL (ref 0–100)
POTASSIUM SERPL-SCNC: 4.4 MMOL/L (ref 3.5–5.2)
PROT SERPL-MCNC: 5.6 G/DL (ref 6–8.5)
SODIUM SERPL-SCNC: 146 MMOL/L (ref 136–145)
T4 FREE SERPL-MCNC: 1.25 NG/DL (ref 0.93–1.7)
TRIGL SERPL-MCNC: 38 MG/DL (ref 0–150)
TSH SERPL DL<=0.005 MIU/L-ACNC: 3.31 UIU/ML (ref 0.27–4.2)
UNABLE TO VOID: NORMAL
VLDLC SERPL CALC-MCNC: 9 MG/DL (ref 5–40)

## 2023-05-08 DIAGNOSIS — E11.8 CONTROLLED TYPE 2 DIABETES MELLITUS WITH COMPLICATION, WITHOUT LONG-TERM CURRENT USE OF INSULIN: Primary | ICD-10-CM

## 2023-05-09 LAB
ALBUMIN/CREAT UR: 7 MG/G CREAT (ref 0–29)
CREAT UR-MCNC: 109.4 MG/DL
MICROALBUMIN UR-MCNC: 8.2 UG/ML

## 2023-05-10 ENCOUNTER — OFFICE VISIT (OUTPATIENT)
Dept: ENDOCRINOLOGY | Age: 73
End: 2023-05-10
Payer: MEDICARE

## 2023-05-10 VITALS
OXYGEN SATURATION: 97 % | TEMPERATURE: 98.2 F | HEART RATE: 81 BPM | DIASTOLIC BLOOD PRESSURE: 70 MMHG | BODY MASS INDEX: 31.38 KG/M2 | HEIGHT: 73 IN | SYSTOLIC BLOOD PRESSURE: 116 MMHG | WEIGHT: 236.8 LBS

## 2023-05-10 DIAGNOSIS — E55.9 VITAMIN D DEFICIENCY: ICD-10-CM

## 2023-05-10 DIAGNOSIS — E03.8 HYPOTHYROIDISM DUE TO HASHIMOTO'S THYROIDITIS: ICD-10-CM

## 2023-05-10 DIAGNOSIS — E78.2 MIXED HYPERLIPIDEMIA: ICD-10-CM

## 2023-05-10 DIAGNOSIS — I10 PRIMARY HYPERTENSION: ICD-10-CM

## 2023-05-10 DIAGNOSIS — E06.3 HYPOTHYROIDISM DUE TO HASHIMOTO'S THYROIDITIS: ICD-10-CM

## 2023-05-10 DIAGNOSIS — E11.649 CONTROLLED TYPE 2 DIABETES MELLITUS WITH HYPOGLYCEMIA, WITHOUT LONG-TERM CURRENT USE OF INSULIN: Primary | ICD-10-CM

## 2023-05-10 RX ORDER — LEVOTHYROXINE SODIUM 0.07 MG/1
75 TABLET ORAL DAILY
Qty: 90 TABLET | Refills: 1 | Status: SHIPPED | OUTPATIENT
Start: 2023-05-10

## 2023-05-10 RX ORDER — ATORVASTATIN CALCIUM 40 MG/1
40 TABLET, FILM COATED ORAL DAILY
Qty: 90 TABLET | Refills: 1 | Status: SHIPPED | OUTPATIENT
Start: 2023-05-10

## 2023-05-10 RX ORDER — SITAGLIPTIN 100 MG/1
100 TABLET, FILM COATED ORAL DAILY
Qty: 90 TABLET | Refills: 1 | Status: SHIPPED | OUTPATIENT
Start: 2023-05-10

## 2023-05-10 RX ORDER — PIOGLITAZONE HCL AND METFORMIN HCL 850; 15 MG/1; MG/1
1 TABLET ORAL 2 TIMES DAILY WITH MEALS
Qty: 180 TABLET | Refills: 1 | Status: SHIPPED | OUTPATIENT
Start: 2023-05-10

## 2023-05-10 RX ORDER — GLIPIZIDE 10 MG/1
10 TABLET, FILM COATED, EXTENDED RELEASE ORAL 2 TIMES DAILY
Qty: 180 TABLET | Refills: 1 | Status: SHIPPED | OUTPATIENT
Start: 2023-05-10

## 2023-05-10 NOTE — PATIENT INSTRUCTIONS
Change pioglitazone-metformin  1 tablet in morning and 1 tablets at night  Continue with Glipizide XL 10 mg twice a day and Januvia 100 mg daily

## 2023-05-10 NOTE — PROGRESS NOTES
Chief Complaint  Chief Complaint   Patient presents with   • Diabetes     Type 2: Pt doesn't have meter, checks bs every morning, is up to date on eye exam, no hx of retinopathy or neuropathy. Pt states that he is needing refills.        Subjective          History of Present Illness    Wilver Segura Sr. 72 y.o.  presents for a follow-up evaluation for type 2 DM     He has been diabetic since 1980s     Grandparents children and siblings have type 2 diabetes.  His brother in 2021 had bilateral leg amputations and is now in a nursing home.       Pt is on the following medications for their DM:  pioglitazone-metformin  1 tablet in morning and 2 tablets at night, Glipizide XL 10 mg twice a day and Januvia 100 mg daily.        Denies diarrhea, constipation, chest pain, shortness of breath, vision changes or numbness and tingling in feet/hands.    Weight loss of 11 lbs since last visit.    Pt does not have a history of DM retinopathy.  Last eye exam was 10/22  Cataracts resected 8/20/2021 and 10/20/2019     Pt does not have a history of nephropathy.  Patient is currently taking ARB     Pt does not have neuropathy.     Pt does not have a history of CAD or CVA.    Last A1C in 04/23 was 6.4    Last microalbumin in 05/23 was negative          Blood Sugars    Blood glucoses are checked 1/day.    Fasting blood glucoses: 70-75    Pt has couple episodes of hypoglycemia a month          Hypothyroid    Diagnosed about 4-5 years ago through lab work    PT complains of dry skin and intermittent cold intolerance.    Denies constipation, diarrhea, hair loss, chest pain, palpitations, heat intolerance    Current treatment is levothyroxine 75 mcg daily    Last labs in 04/23 showed TSH 3.310 and FT4 1.25            Hyperlipidemia     Pt denies any muscle/body aches, chest pain or shortness of breath    Pt is currently taking atorvastatin 40 mg HS    Last lipid panel in 04/23 showed Total 130, HDL 44, LDL 77 and Triglycerides  "38            Hypertension    Pt denies any chest pain, palpitations, shortness of breath or headache    Current regimen includes carvedilol 25 mg BID, amlodipine 10 mg daily and azilsartan 80 mg daily            Vitamin D Deficiency    Current treatment includes 1,000 units daily    Last Vit D level was 35.4 in 04/23          I have reviewed the patient's allergies, medicines, past medical hx, family hx and social hx.    Objective   Vital Signs:   /70   Pulse 81   Temp 98.2 °F (36.8 °C) (Temporal)   Ht 185.4 cm (72.99\")   Wt 107 kg (236 lb 12.8 oz)   SpO2 97%   BMI 31.25 kg/m²       Physical Exam   Physical Exam  Constitutional:       General: He is not in acute distress.     Appearance: Normal appearance. He is not diaphoretic.   HENT:      Head: Normocephalic and atraumatic.   Eyes:      General:         Right eye: No discharge.         Left eye: No discharge.   Cardiovascular:      Pulses:           Dorsalis pedis pulses are 2+ on the right side and 2+ on the left side.   Musculoskeletal:      Right foot: Normal range of motion. No bunion or Charcot foot.      Left foot: Normal range of motion. Bunion present. No Charcot foot.   Feet:      Right foot:      Protective Sensation: 6 sites tested. 6 sites sensed.      Skin integrity: No ulcer, blister, skin breakdown, erythema, warmth, callus, dry skin or fissure.      Toenail Condition: Right toenails are normal.      Left foot:      Protective Sensation: 6 sites tested. 6 sites sensed.      Skin integrity: No ulcer, blister, skin breakdown, erythema, warmth, callus, dry skin or fissure.      Toenail Condition: Left toenails are normal.   Skin:     General: Skin is warm and dry.   Neurological:      Mental Status: He is alert.   Psychiatric:         Mood and Affect: Mood normal.         Behavior: Behavior normal.                    Results Review:   Hemoglobin A1C   Date Value Ref Range Status   04/26/2023 6.40 (H) 4.80 - 5.60 % Final     Comment:     " Hemoglobin A1C Ranges:  Increased Risk for Diabetes  5.7% to 6.4%  Diabetes                     >= 6.5%  Diabetic Goal                < 7.0%     01/20/2020 6.3 (H) 4.3 - 5.6 % Final     Comment:     (note)  A1C% Reference Range:  4.3 - 5.6  Normal range  5.7 - 6.4  Pre-diabetic -increased risk for developing diabetes mellitus.  >=6.5      Diabetic -diagnostic of diabetes mellitus.     Note: For diagnosis of diabetes in individuals without unequivocal   hyperglycemia, results should be confirmed by repeat testing.  Patients with conditions that shorten erythrocyte survival, such as  recovery from acute blood loss, hemolytic anemia, kidney disease,  or the presence of unstable hemoglobins like HbSS, HbCC, and HbSC  may yield falsely decreased HbA1c test results. Iron deficiency may  yield falsely increased HbA1c test results.     Triglycerides   Date Value Ref Range Status   04/26/2023 38 0 - 150 mg/dL Final   01/20/2020 47 0 - 149 mg/dL Final     HDL Cholesterol   Date Value Ref Range Status   04/26/2023 44 40 - 60 mg/dL Final   01/20/2020 44 >39 mg/dL Final     LDL Cholesterol    Date Value Ref Range Status   01/20/2020 92 0 - 100 mg/dL Final     LDL Chol Calc (NIH)   Date Value Ref Range Status   04/26/2023 77 0 - 100 mg/dL Final     VLDL Cholesterol   Date Value Ref Range Status   01/20/2020 9 5 - 40 mg/dL Final     VLDL Cholesterol Jack   Date Value Ref Range Status   04/26/2023 9 5 - 40 mg/dL Final         Assessment and Plan {CC Problem List  Visit Diagnosis  ROS  Review (Popup)  Health Maintenance  Quality  BestPractice  Medications  SmartSets  SnapShot Encounters  Media :23  Diagnoses and all orders for this visit:    1. Controlled type 2 diabetes mellitus with hypoglycemia, without long-term current use of insulin (Primary)  -     pioglitazone-metFORMIN (ACTOPLUS MET)  MG per tablet; Take 1 tablet by mouth 2 (Two) Times a Day With Meals. TAKE 1 TABLETS BY MOUTH IN THE MORNING WITH BREAKFAST  AND 1 TABLET BY MOUTH WITH DINNER  Dispense: 180 tablet; Refill: 1  -     glipizide (GLUCOTROL XL) 10 MG 24 hr tablet; Take 1 tablet by mouth 2 (Two) Times a Day.  Dispense: 180 tablet; Refill: 1  -     Januvia 100 MG tablet; Take 1 tablet by mouth Daily.  Dispense: 90 tablet; Refill: 1  -     Hemoglobin A1c; Future  -     Comprehensive Metabolic Panel; Future    A1c is 6.4% but with some hypoglycemia  Change pioglitazone-metformin  1 tablet in morning and 1 tablets at night  Continue with Glipizide XL 10 mg twice a day and Januvia 100 mg daily  Continue with BG checks  Foot exam completed today  Pt has been trying to lose weight - continue with dietary modification        2. Hypothyroidism due to Hashimoto's thyroiditis  -     levothyroxine (SYNTHROID, LEVOTHROID) 75 MCG tablet; Take 1 tablet by mouth Daily.  Dispense: 90 tablet; Refill: 1  -     TSH Rfx On Abnormal To Free T4; Future    Thyroid labs are good.    Continue with levothyroxine 75 mcg daily        3. Mixed hyperlipidemia  -     atorvastatin (LIPITOR) 40 MG tablet; Take 1 tablet by mouth Daily.  Dispense: 90 tablet; Refill: 1  -     Comprehensive Metabolic Panel; Future  -     Lipid Panel; Future    Lipid panel is good.    Continue with statin.        4. Primary hypertension  -     Comprehensive Metabolic Panel; Future    Stable  Continue with current medication regimen  Defer management to PCP        5. Vitamin D deficiency  -     Vitamin D,25-Hydroxy; Future     Vitamin D levels are good.    Continue with supplement         Refills sent to pharmacy      RTC in 6 months with me, labs prior      Follow Up     Patient was given instructions and counseling regarding her condition or for health maintenance advice. Please see specific information pulled into the AVS if appropriate.              Patti Green, MAURI  05/10/23

## 2023-05-16 DIAGNOSIS — E06.3 HYPOTHYROIDISM DUE TO HASHIMOTO'S THYROIDITIS: ICD-10-CM

## 2023-05-16 DIAGNOSIS — I10 PRIMARY HYPERTENSION: ICD-10-CM

## 2023-05-16 DIAGNOSIS — E78.2 MIXED HYPERLIPIDEMIA: ICD-10-CM

## 2023-05-16 DIAGNOSIS — E11.65 UNCONTROLLED TYPE 2 DIABETES MELLITUS WITH HYPERGLYCEMIA: ICD-10-CM

## 2023-05-16 DIAGNOSIS — E03.8 HYPOTHYROIDISM DUE TO HASHIMOTO'S THYROIDITIS: ICD-10-CM

## 2023-05-17 RX ORDER — AMLODIPINE BESYLATE 10 MG/1
TABLET ORAL
Qty: 90 TABLET | Refills: 1 | Status: SHIPPED | OUTPATIENT
Start: 2023-05-17

## 2023-10-05 DIAGNOSIS — E11.649 CONTROLLED TYPE 2 DIABETES MELLITUS WITH HYPOGLYCEMIA, WITHOUT LONG-TERM CURRENT USE OF INSULIN: ICD-10-CM

## 2023-10-06 RX ORDER — PIOGLITAZONE HCL AND METFORMIN HCL 850; 15 MG/1; MG/1
TABLET ORAL
Qty: 270 TABLET | Refills: 0 | Status: SHIPPED | OUTPATIENT
Start: 2023-10-06

## 2023-10-06 NOTE — TELEPHONE ENCOUNTER
Rx Refill Note  Requested Prescriptions     Pending Prescriptions Disp Refills    pioglitazone-metFORMIN (ACTOPLUS MET)  MG per tablet [Pharmacy Med Name: Pioglitazone HCl-metFORMIN HCl Oral Tablet  MG] 270 tablet 0     Sig: TAKE 2 TABLETS BY MOUTH IN THE MORNING WITH BREAKFAST AND 1 TABLET BY MOUTH WITH DINNER      Last office visit with prescribing clinician: 5/10/2023   Last telemedicine visit with prescribing clinician: Visit date not found   Next office visit with prescribing clinician: 11/13/2023                         Would you like a call back once the refill request has been completed: [] Yes [] No    If the office needs to give you a call back, can they leave a voicemail: [] Yes [] No    Dalia Ulrich  10/06/23, 08:00 EDT

## 2023-10-20 DIAGNOSIS — E06.3 HYPOTHYROIDISM DUE TO HASHIMOTO'S THYROIDITIS: ICD-10-CM

## 2023-10-20 DIAGNOSIS — I10 PRIMARY HYPERTENSION: ICD-10-CM

## 2023-10-20 DIAGNOSIS — E11.65 UNCONTROLLED TYPE 2 DIABETES MELLITUS WITH HYPERGLYCEMIA: ICD-10-CM

## 2023-10-20 DIAGNOSIS — E11.649 CONTROLLED TYPE 2 DIABETES MELLITUS WITH HYPOGLYCEMIA, WITHOUT LONG-TERM CURRENT USE OF INSULIN: ICD-10-CM

## 2023-10-20 DIAGNOSIS — E78.2 MIXED HYPERLIPIDEMIA: ICD-10-CM

## 2023-10-20 DIAGNOSIS — E03.8 HYPOTHYROIDISM DUE TO HASHIMOTO'S THYROIDITIS: ICD-10-CM

## 2023-10-20 RX ORDER — ATORVASTATIN CALCIUM 40 MG/1
40 TABLET, FILM COATED ORAL DAILY
Qty: 90 TABLET | Refills: 1 | Status: SHIPPED | OUTPATIENT
Start: 2023-10-20

## 2023-10-20 RX ORDER — GLIPIZIDE 10 MG/1
10 TABLET, FILM COATED, EXTENDED RELEASE ORAL 2 TIMES DAILY
Qty: 180 TABLET | Refills: 1 | Status: SHIPPED | OUTPATIENT
Start: 2023-10-20 | End: 2023-10-23 | Stop reason: SDUPTHER

## 2023-10-20 RX ORDER — SITAGLIPTIN 100 MG/1
100 TABLET, FILM COATED ORAL DAILY
Qty: 90 TABLET | Refills: 1 | Status: CANCELLED | OUTPATIENT
Start: 2023-10-20

## 2023-10-20 RX ORDER — SITAGLIPTIN 100 MG/1
100 TABLET, FILM COATED ORAL DAILY
Qty: 90 TABLET | Refills: 1 | Status: SHIPPED | OUTPATIENT
Start: 2023-10-20 | End: 2023-10-23 | Stop reason: SDUPTHER

## 2023-10-20 RX ORDER — AMLODIPINE BESYLATE 10 MG/1
10 TABLET ORAL DAILY
Qty: 90 TABLET | Refills: 0 | Status: CANCELLED | OUTPATIENT
Start: 2023-10-20

## 2023-10-20 RX ORDER — AMLODIPINE BESYLATE 10 MG/1
10 TABLET ORAL DAILY
Qty: 90 TABLET | Refills: 0 | Status: SHIPPED | OUTPATIENT
Start: 2023-10-20 | End: 2023-10-23 | Stop reason: SDUPTHER

## 2023-10-20 NOTE — TELEPHONE ENCOUNTER
Rx Refill Note  Requested Prescriptions     Pending Prescriptions Disp Refills    atorvastatin (LIPITOR) 40 MG tablet 90 tablet 1     Sig: Take 1 tablet by mouth Daily.      Last office visit with prescribing clinician: 5/10/2023   Last telemedicine visit with prescribing clinician: Visit date not found   Next office visit with prescribing clinician: 11/13/2023                         Would you like a call back once the refill request has been completed: [] Yes [] No    If the office needs to give you a call back, can they leave a voicemail: [] Yes [] No    Dalia Ulrich  10/20/23, 11:36 EDT

## 2023-10-20 NOTE — TELEPHONE ENCOUNTER
Incoming Refill Request      Medication requested (name and dose): JANIVIA AND GLIPIZIDE    Pharmacy where request should be sent: [facility]    Additional details provided by patient: PATIENT SAW PAULA [facility]    Best call back number: [phone]    Does the patient have less than a 3 day supply:  [] Yes  [] No    Gilberto Morejon Rep  10/20/23, 10:05 EDT

## 2023-10-20 NOTE — TELEPHONE ENCOUNTER
Incoming Refill Request      Medication requested (name and dose): amlodipine besylate tabs 10mg    [facility]    Additional details provided by patient: requesting a 90 day supply    Best call back number: [phone]    Does the patient have less than a 3 day supply:  [] Yes  [] No    Gilberto Monaco Rep  10/20/23, 10:57 EDT

## 2023-10-20 NOTE — TELEPHONE ENCOUNTER
Caller: LORETTA MONTELONGO    Relationship: PATIENT    Best call back number: [phone]    Requested Prescriptions:   Requested Prescriptions     Pending Prescriptions Disp Refills    amLODIPine (NORVASC) 10 MG tablet 90 tablet 0     Sig: Take 1 tablet by mouth Daily.    Januvia 100 MG tablet 90 tablet 1     Sig: Take 1 tablet by mouth Daily.    atorvastatin (LIPITOR) 40 MG tablet 90 tablet 1     Sig: Take 1 tablet by mouth Daily.        Pharmacy where request should be sent: [facility] [address] [phone] [facility] [phone]      Last office visit with prescribing clinician: 5/10/2023   Last telemedicine visit with prescribing clinician: Visit date not found   Next office visit with prescribing clinician: 11/13/2023     Additional details provided by patient:     Does the patient have less than a 3 day supply:  [x] Yes  [] No    Would you like a call back once the refill request has been completed: [x] Yes [] No    If the office needs to give you a call back, can they leave a voicemail: [x] Yes [] No    Gilberto Wray Rep   10/20/23 11:32 EDT

## 2023-10-23 ENCOUNTER — TELEPHONE (OUTPATIENT)
Dept: ENDOCRINOLOGY | Age: 73
End: 2023-10-23

## 2023-10-23 DIAGNOSIS — E06.3 HYPOTHYROIDISM DUE TO HASHIMOTO'S THYROIDITIS: ICD-10-CM

## 2023-10-23 DIAGNOSIS — E11.649 CONTROLLED TYPE 2 DIABETES MELLITUS WITH HYPOGLYCEMIA, WITHOUT LONG-TERM CURRENT USE OF INSULIN: ICD-10-CM

## 2023-10-23 DIAGNOSIS — E78.2 MIXED HYPERLIPIDEMIA: ICD-10-CM

## 2023-10-23 DIAGNOSIS — E03.8 HYPOTHYROIDISM DUE TO HASHIMOTO'S THYROIDITIS: ICD-10-CM

## 2023-10-23 DIAGNOSIS — I10 PRIMARY HYPERTENSION: ICD-10-CM

## 2023-10-23 DIAGNOSIS — E11.65 UNCONTROLLED TYPE 2 DIABETES MELLITUS WITH HYPERGLYCEMIA: ICD-10-CM

## 2023-10-23 RX ORDER — AMLODIPINE BESYLATE 10 MG/1
10 TABLET ORAL DAILY
Qty: 90 TABLET | Refills: 0 | Status: SHIPPED | OUTPATIENT
Start: 2023-10-23

## 2023-10-23 RX ORDER — SITAGLIPTIN 100 MG/1
100 TABLET, FILM COATED ORAL DAILY
Qty: 90 TABLET | Refills: 1 | Status: SHIPPED | OUTPATIENT
Start: 2023-10-23

## 2023-10-23 RX ORDER — GLIPIZIDE 10 MG/1
10 TABLET, FILM COATED, EXTENDED RELEASE ORAL 2 TIMES DAILY
Qty: 180 TABLET | Refills: 1 | Status: SHIPPED | OUTPATIENT
Start: 2023-10-23

## 2023-10-23 RX ORDER — LEVOTHYROXINE SODIUM 0.07 MG/1
75 TABLET ORAL DAILY
Qty: 90 TABLET | Refills: 1 | Status: SHIPPED | OUTPATIENT
Start: 2023-10-23 | End: 2023-10-25

## 2023-10-23 NOTE — TELEPHONE ENCOUNTER
Incoming Refill Request      Medication requested (name and dose): LEVOTHYROXINE    Pharmacy where request should be sent: [facility]    Additional details provided by patient:     Best call back number: [phone]    Does the patient have less than a 3 day supply:  [] Yes  [] No    Gilberto Morejon Rep  10/23/23, 14:30 EDT

## 2023-10-23 NOTE — TELEPHONE ENCOUNTER
Called and spoke with pt he stated that he needs the glipizide, januvia, amlodipine, and levothyroxine sent into [facility].

## 2023-10-23 NOTE — TELEPHONE ENCOUNTER
PT CAME IN AND STATED THAT [facility] AND QUESTIONS ON THREE OF HIS MEDS BEFORE THEY COULD FILL THEM.    glipizide (GLUCOTROL XL) 10 MG 24 hr tablet [41749]   Januvia 100 MG tablet [48117]   amLODIPine (NORVASC) 10 MG tablet [[id number]]       [facility]  [address], [place]

## 2023-10-24 DIAGNOSIS — E06.3 HYPOTHYROIDISM DUE TO HASHIMOTO'S THYROIDITIS: ICD-10-CM

## 2023-10-24 DIAGNOSIS — E03.8 HYPOTHYROIDISM DUE TO HASHIMOTO'S THYROIDITIS: ICD-10-CM

## 2023-10-25 RX ORDER — LEVOTHYROXINE SODIUM 0.07 MG/1
75 TABLET ORAL DAILY
Qty: 90 TABLET | Refills: 1 | Status: SHIPPED | OUTPATIENT
Start: 2023-10-25

## 2023-10-25 NOTE — TELEPHONE ENCOUNTER
Rx Refill Note  Requested Prescriptions     Pending Prescriptions Disp Refills    levothyroxine (SYNTHROID, LEVOTHROID) 75 MCG tablet [Pharmacy Med Name: L-THYROXINE (SYNTHROID) TABS 75MCG] 90 tablet 3     Sig: TAKE 1 TABLET DAILY      Last office visit with prescribing clinician: 5/10/2023   Last telemedicine visit with prescribing clinician: Visit date not found   Next office visit with prescribing clinician: 11/13/2023                         Would you like a call back once the refill request has been completed: [] Yes [] No    If the office needs to give you a call back, can they leave a voicemail: [] Yes [] No    Dalia Ulrich  10/25/23, 08:43 EDT

## 2023-10-30 DIAGNOSIS — E55.9 VITAMIN D DEFICIENCY: ICD-10-CM

## 2023-10-30 DIAGNOSIS — I10 PRIMARY HYPERTENSION: ICD-10-CM

## 2023-10-30 DIAGNOSIS — E78.2 MIXED HYPERLIPIDEMIA: ICD-10-CM

## 2023-10-30 DIAGNOSIS — E06.3 HYPOTHYROIDISM DUE TO HASHIMOTO'S THYROIDITIS: ICD-10-CM

## 2023-10-30 DIAGNOSIS — E03.8 HYPOTHYROIDISM DUE TO HASHIMOTO'S THYROIDITIS: ICD-10-CM

## 2023-10-30 DIAGNOSIS — E11.649 CONTROLLED TYPE 2 DIABETES MELLITUS WITH HYPOGLYCEMIA, WITHOUT LONG-TERM CURRENT USE OF INSULIN: ICD-10-CM

## 2023-10-31 LAB
25(OH)D3+25(OH)D2 SERPL-MCNC: 31.2 NG/ML (ref 30–100)
ALBUMIN SERPL-MCNC: 3.8 G/DL (ref 3.5–5.2)
ALBUMIN/GLOB SERPL: 2.2 G/DL
ALP SERPL-CCNC: 65 U/L (ref 39–117)
ALT SERPL-CCNC: 20 U/L (ref 1–41)
AST SERPL-CCNC: 18 U/L (ref 1–40)
BILIRUB SERPL-MCNC: 0.5 MG/DL (ref 0–1.2)
BUN SERPL-MCNC: 12 MG/DL (ref 8–23)
BUN/CREAT SERPL: 11.3 (ref 7–25)
CALCIUM SERPL-MCNC: 8.9 MG/DL (ref 8.6–10.5)
CHLORIDE SERPL-SCNC: 106 MMOL/L (ref 98–107)
CHOLEST SERPL-MCNC: 132 MG/DL (ref 0–200)
CO2 SERPL-SCNC: 27.2 MMOL/L (ref 22–29)
CREAT SERPL-MCNC: 1.06 MG/DL (ref 0.76–1.27)
EGFRCR SERPLBLD CKD-EPI 2021: 74.6 ML/MIN/1.73
GLOBULIN SER CALC-MCNC: 1.7 GM/DL
GLUCOSE SERPL-MCNC: 132 MG/DL (ref 65–99)
HBA1C MFR BLD: 6.8 % (ref 4.8–5.6)
HDLC SERPL-MCNC: 48 MG/DL (ref 40–60)
IMP & REVIEW OF LAB RESULTS: NORMAL
LDLC SERPL CALC-MCNC: 76 MG/DL (ref 0–100)
POTASSIUM SERPL-SCNC: 4.1 MMOL/L (ref 3.5–5.2)
PROT SERPL-MCNC: 5.5 G/DL (ref 6–8.5)
SODIUM SERPL-SCNC: 142 MMOL/L (ref 136–145)
TRIGL SERPL-MCNC: 30 MG/DL (ref 0–150)
TSH SERPL DL<=0.005 MIU/L-ACNC: 4.04 UIU/ML (ref 0.27–4.2)
VLDLC SERPL CALC-MCNC: 8 MG/DL (ref 5–40)

## 2023-11-13 ENCOUNTER — OFFICE VISIT (OUTPATIENT)
Dept: ENDOCRINOLOGY | Age: 73
End: 2023-11-13
Payer: MEDICARE

## 2023-11-13 ENCOUNTER — TELEPHONE (OUTPATIENT)
Dept: ENDOCRINOLOGY | Age: 73
End: 2023-11-13

## 2023-11-13 VITALS
BODY MASS INDEX: 32.1 KG/M2 | HEART RATE: 74 BPM | DIASTOLIC BLOOD PRESSURE: 82 MMHG | TEMPERATURE: 97.9 F | OXYGEN SATURATION: 97 % | WEIGHT: 242.2 LBS | SYSTOLIC BLOOD PRESSURE: 126 MMHG | HEIGHT: 73 IN

## 2023-11-13 DIAGNOSIS — E06.3 HYPOTHYROIDISM DUE TO HASHIMOTO'S THYROIDITIS: ICD-10-CM

## 2023-11-13 DIAGNOSIS — I10 PRIMARY HYPERTENSION: ICD-10-CM

## 2023-11-13 DIAGNOSIS — E78.2 MIXED HYPERLIPIDEMIA: ICD-10-CM

## 2023-11-13 DIAGNOSIS — E55.9 VITAMIN D DEFICIENCY: ICD-10-CM

## 2023-11-13 DIAGNOSIS — E11.649 CONTROLLED TYPE 2 DIABETES MELLITUS WITH HYPOGLYCEMIA, WITHOUT LONG-TERM CURRENT USE OF INSULIN: Primary | ICD-10-CM

## 2023-11-13 DIAGNOSIS — E03.8 HYPOTHYROIDISM DUE TO HASHIMOTO'S THYROIDITIS: ICD-10-CM

## 2023-11-13 PROCEDURE — 3079F DIAST BP 80-89 MM HG: CPT | Performed by: NURSE PRACTITIONER

## 2023-11-13 PROCEDURE — 3074F SYST BP LT 130 MM HG: CPT | Performed by: NURSE PRACTITIONER

## 2023-11-13 PROCEDURE — 3044F HG A1C LEVEL LT 7.0%: CPT | Performed by: NURSE PRACTITIONER

## 2023-11-13 PROCEDURE — 99214 OFFICE O/P EST MOD 30 MIN: CPT | Performed by: NURSE PRACTITIONER

## 2023-11-13 RX ORDER — PIOGLITAZONE HCL AND METFORMIN HCL 850; 15 MG/1; MG/1
1 TABLET ORAL 2 TIMES DAILY WITH MEALS
Start: 2023-11-13

## 2023-11-13 RX ORDER — GLIPIZIDE 10 MG/1
10 TABLET, FILM COATED, EXTENDED RELEASE ORAL DAILY
Start: 2023-11-13

## 2023-11-13 RX ORDER — CARVEDILOL 25 MG/1
1 TABLET ORAL 2 TIMES DAILY WITH MEALS
COMMUNITY
Start: 2023-07-18

## 2023-11-13 NOTE — PROGRESS NOTES
Chief Complaint  Chief Complaint   Patient presents with    Diabetes     Type 2: Pt doesn't have meter today, does have readings, is up to date on eye exam, no hx of retinopathy or neuropathy.        Subjective          History of Present Illness    Wilver Segura Sr. 73 y.o. presents for a follow-up evaluation for type 2 DM     He has been diabetic since 1980s     Grandparents children and siblings have type 2 diabetes.  His brother in 2021 had bilateral leg amputations and is now in a nursing home.        Pt is on the following medications for their DM:  pioglitazone-metformin  1 tablet in morning and 1 tablet at night, Glipizide XL 10 mg twice a day and Januvia 100 mg daily.       Denies diarrhea, constipation, chest pain, shortness of breath, vision changes or numbness and tingling in feet/hands.    Weight gain of 2 lbs since last visit.    Pt does not have a history of DM retinopathy.  Last eye exam was 09/23  Cataracts resected 8/20/2021 and 10/20/2019     Pt does not have a history of nephropathy.  Patient is currently taking ARB     Pt does not have neuropathy.     Pt does not have a history of CAD or CVA.    Last A1C in 10/23 was 6.80    Last microalbumin in 05/23 was negative          Blood Sugars    Blood glucoses are checked 1/day.    Fasting blood glucoses: 60s - 90s    Pt has episodes of hypoglycemia.            Hypothyroid    Diagnosed about 4-5 years ago through lab work     PT complains of dry skin     Denies constipation, diarrhea, hair loss, chest pain, palpitations and cold intolerance     Current treatment is levothyroxine 75 mcg daily    Last labs in 10/23 showed TSH 1.040            Hyperlipidemia     Pt denies any muscle/body aches, chest pain or shortness of breath    Pt is currently taking atorvastatin 40 mg HS     Last lipid panel in 10/23 showed Total 132, HDL 48, LDL 76 and Triglycerides 30            Hypertension    Pt denies any chest pain, palpitations, shortness of breath or  "headache    Current regimen includes carvedilol 25 mg BID, amlodipine 10 mg daily and edarbi 80 mg daily               Vitamin D Deficiency    Current treatment includes 1,000 units daily    Last Vit D level was 31.2 in 10/23           I have reviewed the patient's allergies, medicines, past medical hx, family hx and social hx.    Objective   Vital Signs:   /82   Pulse 74   Temp 97.9 °F (36.6 °C) (Oral)   Ht 185.4 cm (72.99\")   Wt 110 kg (242 lb 3.2 oz)   SpO2 97%   BMI 31.96 kg/m²       Physical Exam   Physical Exam  Constitutional:       General: He is not in acute distress.     Appearance: Normal appearance. He is not diaphoretic.   HENT:      Head: Normocephalic and atraumatic.   Eyes:      General:         Right eye: No discharge.         Left eye: No discharge.   Skin:     General: Skin is warm and dry.   Neurological:      Mental Status: He is alert.   Psychiatric:         Mood and Affect: Mood normal.         Behavior: Behavior normal.                    Results Review:   Hemoglobin A1C   Date Value Ref Range Status   10/30/2023 6.80 (H) 4.80 - 5.60 % Final     Comment:     Hemoglobin A1C Ranges:  Increased Risk for Diabetes  5.7% to 6.4%  Diabetes                     >= 6.5%  Diabetic Goal                < 7.0%     01/20/2020 6.3 (H) 4.3 - 5.6 % Final     Comment:     (note)  A1C% Reference Range:  4.3 - 5.6  Normal range  5.7 - 6.4  Pre-diabetic -increased risk for developing diabetes mellitus.  >=6.5      Diabetic -diagnostic of diabetes mellitus.     Note: For diagnosis of diabetes in individuals without unequivocal   hyperglycemia, results should be confirmed by repeat testing.  Patients with conditions that shorten erythrocyte survival, such as  recovery from acute blood loss, hemolytic anemia, kidney disease,  or the presence of unstable hemoglobins like HbSS, HbCC, and HbSC  may yield falsely decreased HbA1c test results. Iron deficiency may  yield falsely increased HbA1c test results. "     Triglycerides   Date Value Ref Range Status   10/30/2023 30 0 - 150 mg/dL Final   01/20/2020 47 0 - 149 mg/dL Final     HDL Cholesterol   Date Value Ref Range Status   10/30/2023 48 40 - 60 mg/dL Final   01/20/2020 44 >39 mg/dL Final     LDL Cholesterol    Date Value Ref Range Status   01/20/2020 92 0 - 100 mg/dL Final     LDL Chol Calc (NIH)   Date Value Ref Range Status   10/30/2023 76 0 - 100 mg/dL Final     VLDL Cholesterol   Date Value Ref Range Status   01/20/2020 9 5 - 40 mg/dL Final     VLDL Cholesterol Jack   Date Value Ref Range Status   10/30/2023 8 5 - 40 mg/dL Final         Assessment and Plan {CC Problem List  Visit Diagnosis  ROS  Review (Popup)  Health Maintenance  Quality  BestPractice  Medications  SmartSets  SnapShot Encounters  Media :23  Diagnoses and all orders for this visit:    1. Controlled type 2 diabetes mellitus with hypoglycemia, without long-term current use of insulin (Primary)  -     pioglitazone-metFORMIN (ACTOPLUS MET)  MG per tablet; Take 1 tablet by mouth 2 (Two) Times a Day With Meals.  -     glipizide (GLUCOTROL XL) 10 MG 24 hr tablet; Take 1 tablet by mouth Daily.  -     Hemoglobin A1c; Future  -     Comprehensive Metabolic Panel; Future  -     Microalbumin / Creatinine Urine Ratio - Urine, Clean Catch; Future  -     Fructosamine; Future    A1c is 6.8% but with some hypoglycemia  Continue with pioglitazone-metformin  1 tablet twice daily and Januvia 100 mg daily  Change Glipizide XL 10 mg daily  Continue with BG checks      2. Hypothyroidism due to Hashimoto's thyroiditis  -     TSH; Future  -     T4, Free; Future    Thyroid labs are good.    Continue with levothyroxine 75 mcg daily      3. Mixed hyperlipidemia  -     Comprehensive Metabolic Panel; Future  -     Lipid Panel; Future    Lipid panel is good.    Continue with statin.      4. Primary hypertension  -     Comprehensive Metabolic Panel; Future    Stable  Continue with current medication  regimen  Defer management to PCP      5. Vitamin D deficiency  -     Vitamin D,25-Hydroxy; Future    Vitamin D levels are good.    Continue with supplement          No refills needed at this time      RTC in 6 months with me, labs prior      Follow Up     Patient was given instructions and counseling regarding her condition or for health maintenance advice. Please see specific information pulled into the AVS if appropriate.              Patti Green, APRN  11/13/23

## 2023-11-13 NOTE — TELEPHONE ENCOUNTER
Caller: Wilver Segura Sr.    Relationship to patient: Self    Best call back number: [phone]    Chief complaint: NA    Type of visit: LABS     Requested date: 05/06/24 AFTERNOON     If rescheduling, when is the original appointment: 05/06/24     Additional notes: PT WOULD LIKE TO HAVE LAB APPT CHANGED TO 10 AM OR LATER ON 05/06/24

## 2023-12-04 DIAGNOSIS — E78.2 MIXED HYPERLIPIDEMIA: ICD-10-CM

## 2023-12-04 DIAGNOSIS — E11.65 UNCONTROLLED TYPE 2 DIABETES MELLITUS WITH HYPERGLYCEMIA: ICD-10-CM

## 2023-12-04 DIAGNOSIS — E03.8 HYPOTHYROIDISM DUE TO HASHIMOTO'S THYROIDITIS: ICD-10-CM

## 2023-12-04 DIAGNOSIS — I10 PRIMARY HYPERTENSION: ICD-10-CM

## 2023-12-04 DIAGNOSIS — E06.3 HYPOTHYROIDISM DUE TO HASHIMOTO'S THYROIDITIS: ICD-10-CM

## 2023-12-04 NOTE — TELEPHONE ENCOUNTER
Caller: Wilver Segura Sr.    Relationship: Self    Best call back number: [phone] (home)       Requested Prescriptions:   Requested Prescriptions     Pending Prescriptions Disp Refills    glucose blood test strip 150 each 3     Si each by Other route 3 (Three) Times a Day.        Pharmacy where request should be sent:  [facility]     Last office visit with prescribing clinician: 2023   Last telemedicine visit with prescribing clinician: Visit date not found   Next office visit with prescribing clinician: 2024     Additional details provided by patient:     Does the patient have less than a 3 day supply:  [x] Yes  [] No    Would you like a call back once the refill request has been completed: [] Yes [x] No    If the office needs to give you a call back, can they leave a voicemail: [] Yes [] No    Gilberto Vasquez Rep   23 10:10 EST

## 2024-01-08 DIAGNOSIS — E11.649 CONTROLLED TYPE 2 DIABETES MELLITUS WITH HYPOGLYCEMIA, WITHOUT LONG-TERM CURRENT USE OF INSULIN: ICD-10-CM

## 2024-01-09 RX ORDER — PIOGLITAZONE HCL AND METFORMIN HCL 850; 15 MG/1; MG/1
TABLET ORAL
Qty: 270 TABLET | Refills: 0 | Status: SHIPPED | OUTPATIENT
Start: 2024-01-09

## 2024-01-09 NOTE — TELEPHONE ENCOUNTER
Rx Refill Note  Requested Prescriptions     Pending Prescriptions Disp Refills    pioglitazone-metFORMIN (ACTOPLUS MET)  MG per tablet [Pharmacy Med Name: Pioglitazone HCl-metFORMIN HCl Oral Tablet  MG] 270 tablet 0     Sig: TAKE 2 TABLETS BY MOUTH IN THE MORNING WITH BREAKFAST AND 1 TABLET WITH DINNER      Last office visit with prescribing clinician: Visit date not found   Last telemedicine visit with prescribing clinician: Visit date not found   Next office visit with prescribing clinician: Visit date not found                         Would you like a call back once the refill request has been completed: [] Yes [] No    If the office needs to give you a call back, can they leave a voicemail: [] Yes [] No    Aurora Ulrich MA  01/09/24, 07:43 EST

## 2024-01-15 ENCOUNTER — TELEPHONE (OUTPATIENT)
Dept: ENDOCRINOLOGY | Age: 74
End: 2024-01-15
Payer: MEDICARE

## 2024-01-15 NOTE — TELEPHONE ENCOUNTER
This needs to come from his PCP as we do not manage his blood pressure - if he needs a 30 day supply to cover him then I can send it to a local pharmacy.

## 2024-01-15 NOTE — TELEPHONE ENCOUNTER
Incoming Refill Request      Medication requested (name and dose): AMLODIPINE BESYLATE TABS    Pharmacy where request should be sent: [facility]    Additional details provided by patient: [facility]    Best call back number: [phone]    Does the patient have less than a 3 day supply:  [] Yes  [] No    Latanya Blum, [employer] Rep  01/15/24, 09:05 EST

## 2024-03-25 ENCOUNTER — TELEPHONE (OUTPATIENT)
Dept: ENDOCRINOLOGY | Age: 74
End: 2024-03-25
Payer: MEDICARE

## 2024-03-25 NOTE — TELEPHONE ENCOUNTER
Called and spoke with pt and he stated that his bs was 260 on Saturday and he doesn't know if he should only take once a day.

## 2024-03-25 NOTE — TELEPHONE ENCOUNTER
He has sugar in his urine because he has diabetes.  I decreased his glipizde dose because he was having low blood sugars.  Unless his blood sugars have significantly gone up, he needs to change it back to take glipizide only once a day.

## 2024-03-25 NOTE — TELEPHONE ENCOUNTER
Hub staff attempted to follow warm transfer process and was unsuccessful     Caller: Wilver Segura Sr.    Relationship to patient: Self    Best call back number: [phone]    Patient is needing: PT WENT TO THE ER AND HAD GLUCOSE IN HIS URINE SO THE PT HAS GONE BACK TO HIS OLD MEDICINE AND OLD DOSAGE  WHICH GOT HIS SUGAR DOWN. WANTED ARTHUR TO KNOW

## 2024-03-25 NOTE — TELEPHONE ENCOUNTER
Called and spoke with pt and he stated that he is wanting to take 2 tablet in the morning and 1 at night and then he will start taking the glipizide once a day to see if it helps his bs and then when he comes in for his follow up and gets labs he will talk with you about changes.

## 2024-03-25 NOTE — TELEPHONE ENCOUNTER
Pt called back in and stated that he thinks what made his sugar run higher was that he was only taking his pioglitazone-metformin once in the morning and once in the evening. Should he be taking 2 tablets in the morning or 1?

## 2024-03-25 NOTE — TELEPHONE ENCOUNTER
Pt informed and stated that he will try this for about a week and give us a call at the end of the week and let us know how his bs are doing and at that time will decide if he needs to come in sooner.

## 2024-03-25 NOTE — TELEPHONE ENCOUNTER
I am not going to prescribe the combination medication for 2 in the morning and 1 in the evening.  It is not advised to take that much metformin and there is no benefit in taking that amount of pioglitazone (it will just increase potential side effects more than anything).  I will help him with his blood sugars after I get blood sugars at varying times of the day so I can better make adjustments to his medication regimen.  If he would like I can stop the combination medication and prescribe pioglitazone 30 mg daily along with metformin 1,000 mg twice a day (which is the max dose).

## 2024-03-25 NOTE — TELEPHONE ENCOUNTER
No, he can not take his pioglitazone-metformin combination pill 3 times a day; if he did he would be taking over the max daily amount.  If he is concerned about his blood sugars then have him check blood sugars daily, varying the time of the day (in the morning when you get up, before lunch/dinner, bedtime or 2 hours after a meal) and I can review them at his up coming appointment or he can come in for an early visit.

## 2024-03-26 NOTE — TELEPHONE ENCOUNTER
Spoke to Mr. Segura, he gave me the BS readout since last Saturday. Will give to Patti & see what she wants to do-

## 2024-03-27 ENCOUNTER — TELEPHONE (OUTPATIENT)
Dept: ENDOCRINOLOGY | Age: 74
End: 2024-03-27
Payer: MEDICARE

## 2024-03-27 DIAGNOSIS — E78.2 MIXED HYPERLIPIDEMIA: ICD-10-CM

## 2024-03-27 RX ORDER — ATORVASTATIN CALCIUM 40 MG/1
40 TABLET, FILM COATED ORAL DAILY
Qty: 90 TABLET | Refills: 1 | Status: SHIPPED | OUTPATIENT
Start: 2024-03-27

## 2024-03-27 NOTE — TELEPHONE ENCOUNTER
Explained to patient his BS in the morning are too low, Patti would like for him to take Metformin twice a day and one Glipizide in the morning to correct. Patient will return with a log of his blood sugars through the day on 4/18/24.

## 2024-03-27 NOTE — TELEPHONE ENCOUNTER
Incoming Refill Request      Medication requested (name and dose): ATORVASTATIN TAB 40 MG    Pharmacy where request should be sent: [facility]    Additional details provided by patient: [facility]    Best call back number: [phone]    Does the patient have less than a 3 day supply:  [] Yes  [] No    Gilberto Zamarripa Rep  03/27/24, 11:06 EDT

## 2024-04-08 ENCOUNTER — TELEPHONE (OUTPATIENT)
Dept: ENDOCRINOLOGY | Age: 74
End: 2024-04-08